# Patient Record
Sex: FEMALE | Race: ASIAN | NOT HISPANIC OR LATINO | URBAN - METROPOLITAN AREA
[De-identification: names, ages, dates, MRNs, and addresses within clinical notes are randomized per-mention and may not be internally consistent; named-entity substitution may affect disease eponyms.]

---

## 2021-04-22 ENCOUNTER — OUTPATIENT (OUTPATIENT)
Dept: INPATIENT UNIT | Facility: HOSPITAL | Age: 36
LOS: 1 days | Discharge: HOME | End: 2021-04-22
Payer: COMMERCIAL

## 2021-04-22 ENCOUNTER — INPATIENT (INPATIENT)
Facility: HOSPITAL | Age: 36
LOS: 3 days | Discharge: HOME | End: 2021-04-26
Attending: OBSTETRICS & GYNECOLOGY | Admitting: OBSTETRICS & GYNECOLOGY

## 2021-04-22 VITALS — DIASTOLIC BLOOD PRESSURE: 79 MMHG | SYSTOLIC BLOOD PRESSURE: 127 MMHG | HEART RATE: 86 BPM

## 2021-04-22 VITALS — TEMPERATURE: 98 F

## 2021-04-22 VITALS — HEART RATE: 75 BPM | DIASTOLIC BLOOD PRESSURE: 66 MMHG | SYSTOLIC BLOOD PRESSURE: 134 MMHG

## 2021-04-22 DIAGNOSIS — N85.8 OTHER SPECIFIED NONINFLAMMATORY DISORDERS OF UTERUS: Chronic | ICD-10-CM

## 2021-04-22 DIAGNOSIS — Z3A.00 WEEKS OF GESTATION OF PREGNANCY NOT SPECIFIED: ICD-10-CM

## 2021-04-22 DIAGNOSIS — O26.899 OTHER SPECIFIED PREGNANCY RELATED CONDITIONS, UNSPECIFIED TRIMESTER: ICD-10-CM

## 2021-04-22 PROCEDURE — 76818 FETAL BIOPHYS PROFILE W/NST: CPT | Mod: 26

## 2021-04-22 PROCEDURE — 99205 OFFICE O/P NEW HI 60 MIN: CPT | Mod: 25

## 2021-04-22 RX ORDER — OXYTOCIN 10 UNIT/ML
333.33 VIAL (ML) INJECTION
Qty: 20 | Refills: 0 | Status: DISCONTINUED | OUTPATIENT
Start: 2021-04-22 | End: 2021-04-24

## 2021-04-22 RX ORDER — AMPICILLIN TRIHYDRATE 250 MG
2 CAPSULE ORAL ONCE
Refills: 0 | Status: COMPLETED | OUTPATIENT
Start: 2021-04-22 | End: 2021-04-22

## 2021-04-22 RX ORDER — AMPICILLIN TRIHYDRATE 250 MG
1 CAPSULE ORAL EVERY 4 HOURS
Refills: 0 | Status: DISCONTINUED | OUTPATIENT
Start: 2021-04-22 | End: 2021-04-24

## 2021-04-22 RX ORDER — SODIUM CHLORIDE 9 MG/ML
1000 INJECTION, SOLUTION INTRAVENOUS
Refills: 0 | Status: DISCONTINUED | OUTPATIENT
Start: 2021-04-22 | End: 2021-04-24

## 2021-04-22 RX ORDER — SODIUM CHLORIDE 9 MG/ML
1000 INJECTION, SOLUTION INTRAVENOUS ONCE
Refills: 0 | Status: COMPLETED | OUTPATIENT
Start: 2021-04-22 | End: 2021-04-23

## 2021-04-22 RX ADMIN — SODIUM CHLORIDE 125 MILLILITER(S): 9 INJECTION, SOLUTION INTRAVENOUS at 23:58

## 2021-04-22 RX ADMIN — Medication 216 GRAM(S): at 23:59

## 2021-04-22 NOTE — OB PROVIDER TRIAGE NOTE - HISTORY OF PRESENT ILLNESS
35 y/o F  at 40.2 presented to triage with c/o 4 contractions in 1 hour and bloody show. Pt endorses +Fm. Denies any lof, vb at the moment. 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 Otherwise, pregnancy thus far has been uncomplicated.

## 2021-04-22 NOTE — OB RN TRIAGE NOTE - TERM DELIVERIES, OB PROFILE
I will SWITCH the dose or number of times a day I take the medications listed below when I get home from the hospital:  None
0

## 2021-04-22 NOTE — OB PROVIDER H&P - ASSESSMENT
37 yo  @ 40.2 wks c/o contractions increase in frequency and intensity since this afternoon. contractions started at 0230 seen in D&T and discharged home. denies vb or lof, reports +GFM. AP course complicated by + GBS, HSV1- cold sores, anemia, yeast infection last week. denies fever chills dysuria n/v ha new swelling vision changes cp sob cough. last seen by OB tuesday VE 0.5, EFW 8#0. scheduled for induction 21.    GBS: Positive  meds: PNV Valtrex, iron  all: denies  PMH: thalessemia trait, anemia  PSH: ob procedure in office for urinary cyst?   gyn hx: denies  ob hx: denies    d/w Dr Mcqueen admit for labor @ 40.2 wks  Ampicillin for +GBS  for epidural for pain  prenatals reviewed  covid swab sent  repeat VE

## 2021-04-22 NOTE — OB PROVIDER TRIAGE NOTE - NSOBPROVIDERNOTE_OBGYN_ALL_OB_FT
Clear Discussed with Dr. Segal- maternal and fetal status reassuring. Ok to discharge patient home     No evidence of active labor at the moment.

## 2021-04-22 NOTE — OB PROVIDER TRIAGE NOTE - NSHPPHYSICALEXAM_GEN_ALL_CORE
LS clear bilaterally  abd soft gravid NT  Cv RRR  FHT: + accels negative decels moderate variability  toco: q 4-5 min 5/10 pain scale  Vital Signs Last 24 Hrs  T(C): 36.8 (22 Apr 2021 22:18), Max: 36.8 (22 Apr 2021 22:16)  T(F): 98.2 (22 Apr 2021 22:18), Max: 98.24 (22 Apr 2021 22:16)  HR: 65 (22 Apr 2021 22:18) (65 - 86)  BP: 129/60 (22 Apr 2021 22:18) (127/79 - 134/66)  BP(mean): --  RR: 16 (22 Apr 2021 22:18) (16 - 16)  SpO2: --

## 2021-04-22 NOTE — OB RN TRIAGE NOTE - NS_DISPOSITION_OBGYN_ALL_OB
Continue to Observe Discharge H Plasty Text: Given the location of the defect, shape of the defect and the proximity to free margins a H-plasty was deemed most appropriate for repair.  Using a sterile surgical marker, the appropriate advancement arms of the H-plasty were drawn incorporating the defect and placing the expected incisions within the relaxed skin tension lines where possible. The area thus outlined was incised deep to adipose tissue with a #15 scalpel blade. The skin margins were undermined to an appropriate distance in all directions utilizing iris scissors.  The opposing advancement arms were then advanced into place in opposite direction and anchored with interrupted buried subcutaneous sutures.

## 2021-04-22 NOTE — OB PROVIDER H&P - NSPPHNORISK_OBGYN_ALL_OB
What Is The Reason For Today's Visit?: Excessive sun exposure
In my judgment no risk for PPH has been identified at this time.

## 2021-04-22 NOTE — OB PROVIDER H&P - PROBLEM SELECTOR PLAN 1
d/w Dr Mcqueen admit for labor @ 40.2 wks  Ampicillin for +GBS  for epidural for pain  prenatals reviewed  covid swab sent  repeat VE

## 2021-04-22 NOTE — OB PROVIDER TRIAGE NOTE - HISTORY OF PRESENT ILLNESS
35 yo  @ 40.2 wks c/o contractions increase in frequency and intensity since this afternoon. contractions started at 0230 seen in D&T and discharged home. denies vb or lof, reports +GFM. AP course complicated by + GBS, HSV1- cold sores, anemia, yeast infection last week. denies fever chills dysuria n/v ha new swelling vision changes cp sob cough. last seen by OB tuesday VE 0.5, EFW 8#0. scheduled for induction 21.    GBS: Positive  meds: PNV Valtrex, iron  all: denies  PMH: thalessemia trait, anemia  PSH: ob procedure in office for urinary cyst?   gyn hx: denies  ob hx: denies

## 2021-04-22 NOTE — OB PROVIDER H&P - HISTORY OF PRESENT ILLNESS
37 yo  @ 40.2 wks c/o contractions increase in frequency and intensity since this afternoon. contractions started at 0230 seen in D&T and discharged home. denies vb or lof, reports +GFM. AP course complicated by + GBS, HSV1- cold sores, anemia, yeast infection last week. denies fever chills dysuria n/v ha new swelling vision changes cp sob cough. last seen by OB tuesday VE 0.5, EFW 8#0. scheduled for induction 21.    GBS: Positive  meds: PNV Valtrex, iron  all: denies  PMH: thalessemia trait, anemia  PSH: ob procedure in office for urinary cyst?   gyn hx: denies  ob hx: denies

## 2021-04-23 ENCOUNTER — TRANSCRIPTION ENCOUNTER (OUTPATIENT)
Age: 36
End: 2021-04-23

## 2021-04-23 PROBLEM — B00.89 OTHER HERPESVIRAL INFECTION: Chronic | Status: ACTIVE | Noted: 2021-04-22

## 2021-04-23 PROBLEM — D64.9 ANEMIA, UNSPECIFIED: Chronic | Status: ACTIVE | Noted: 2021-04-22

## 2021-04-23 LAB
BASOPHILS # BLD AUTO: 0.02 K/UL — SIGNIFICANT CHANGE UP (ref 0–0.2)
BASOPHILS NFR BLD AUTO: 0.2 % — SIGNIFICANT CHANGE UP (ref 0–2)
BLD GP AB SCN SERPL QL: NEGATIVE — SIGNIFICANT CHANGE UP
COVID-19 SPIKE DOMAIN AB INTERP: POSITIVE
COVID-19 SPIKE DOMAIN ANTIBODY RESULT: >250 U/ML — HIGH
EOSINOPHIL # BLD AUTO: 0.03 K/UL — SIGNIFICANT CHANGE UP (ref 0–0.5)
EOSINOPHIL NFR BLD AUTO: 0.3 % — SIGNIFICANT CHANGE UP (ref 0–6)
HCT VFR BLD CALC: 34.4 % — LOW (ref 34.5–45)
HGB BLD-MCNC: 10.9 G/DL — LOW (ref 11.5–15.5)
IANC: 8.38 K/UL — SIGNIFICANT CHANGE UP (ref 1.5–8.5)
IMM GRANULOCYTES NFR BLD AUTO: 0.6 % — SIGNIFICANT CHANGE UP (ref 0–1.5)
LYMPHOCYTES # BLD AUTO: 1.14 K/UL — SIGNIFICANT CHANGE UP (ref 1–3.3)
LYMPHOCYTES # BLD AUTO: 11 % — LOW (ref 13–44)
MCHC RBC-ENTMCNC: 24 PG — LOW (ref 27–34)
MCHC RBC-ENTMCNC: 31.7 GM/DL — LOW (ref 32–36)
MCV RBC AUTO: 75.6 FL — LOW (ref 80–100)
MONOCYTES # BLD AUTO: 0.78 K/UL — SIGNIFICANT CHANGE UP (ref 0–0.9)
MONOCYTES NFR BLD AUTO: 7.5 % — SIGNIFICANT CHANGE UP (ref 2–14)
NEUTROPHILS # BLD AUTO: 8.38 K/UL — HIGH (ref 1.8–7.4)
NEUTROPHILS NFR BLD AUTO: 80.4 % — HIGH (ref 43–77)
NRBC # BLD: 0 /100 WBCS — SIGNIFICANT CHANGE UP
NRBC # FLD: 0 K/UL — SIGNIFICANT CHANGE UP
PLATELET # BLD AUTO: 236 K/UL — SIGNIFICANT CHANGE UP (ref 150–400)
RBC # BLD: 4.55 M/UL — SIGNIFICANT CHANGE UP (ref 3.8–5.2)
RBC # FLD: 13.3 % — SIGNIFICANT CHANGE UP (ref 10.3–14.5)
RH IG SCN BLD-IMP: POSITIVE — SIGNIFICANT CHANGE UP
RH IG SCN BLD-IMP: POSITIVE — SIGNIFICANT CHANGE UP
SARS-COV-2 IGG+IGM SERPL QL IA: >250 U/ML — HIGH
SARS-COV-2 IGG+IGM SERPL QL IA: POSITIVE
SARS-COV-2 RNA SPEC QL NAA+PROBE: SIGNIFICANT CHANGE UP
T PALLIDUM AB TITR SER: NEGATIVE — SIGNIFICANT CHANGE UP
WBC # BLD: 10.41 K/UL — SIGNIFICANT CHANGE UP (ref 3.8–10.5)
WBC # FLD AUTO: 10.41 K/UL — SIGNIFICANT CHANGE UP (ref 3.8–10.5)

## 2021-04-23 RX ORDER — IBUPROFEN 200 MG
1 TABLET ORAL
Qty: 0 | Refills: 0 | DISCHARGE
Start: 2021-04-23

## 2021-04-23 RX ORDER — PRAMOXINE HYDROCHLORIDE 150 MG/15G
1 AEROSOL, FOAM RECTAL EVERY 4 HOURS
Refills: 0 | Status: DISCONTINUED | OUTPATIENT
Start: 2021-04-23 | End: 2021-04-26

## 2021-04-23 RX ORDER — DIPHENHYDRAMINE HCL 50 MG
25 CAPSULE ORAL EVERY 6 HOURS
Refills: 0 | Status: DISCONTINUED | OUTPATIENT
Start: 2021-04-23 | End: 2021-04-26

## 2021-04-23 RX ORDER — ACETAMINOPHEN 500 MG
975 TABLET ORAL
Refills: 0 | Status: DISCONTINUED | OUTPATIENT
Start: 2021-04-23 | End: 2021-04-26

## 2021-04-23 RX ORDER — MAGNESIUM HYDROXIDE 400 MG/1
30 TABLET, CHEWABLE ORAL
Refills: 0 | Status: DISCONTINUED | OUTPATIENT
Start: 2021-04-23 | End: 2021-04-26

## 2021-04-23 RX ORDER — OXYCODONE HYDROCHLORIDE 5 MG/1
5 TABLET ORAL ONCE
Refills: 0 | Status: DISCONTINUED | OUTPATIENT
Start: 2021-04-23 | End: 2021-04-26

## 2021-04-23 RX ORDER — AER TRAVELER 0.5 G/1
1 SOLUTION RECTAL; TOPICAL EVERY 4 HOURS
Refills: 0 | Status: DISCONTINUED | OUTPATIENT
Start: 2021-04-23 | End: 2021-04-26

## 2021-04-23 RX ORDER — OXYTOCIN 10 UNIT/ML
2 VIAL (ML) INJECTION
Qty: 30 | Refills: 0 | Status: DISCONTINUED | OUTPATIENT
Start: 2021-04-23 | End: 2021-04-23

## 2021-04-23 RX ORDER — SODIUM CHLORIDE 9 MG/ML
3 INJECTION INTRAMUSCULAR; INTRAVENOUS; SUBCUTANEOUS EVERY 8 HOURS
Refills: 0 | Status: DISCONTINUED | OUTPATIENT
Start: 2021-04-23 | End: 2021-04-26

## 2021-04-23 RX ORDER — OXYTOCIN 10 UNIT/ML
333.33 VIAL (ML) INJECTION
Qty: 20 | Refills: 0 | Status: DISCONTINUED | OUTPATIENT
Start: 2021-04-23 | End: 2021-04-24

## 2021-04-23 RX ORDER — DIBUCAINE 1 %
1 OINTMENT (GRAM) RECTAL EVERY 6 HOURS
Refills: 0 | Status: DISCONTINUED | OUTPATIENT
Start: 2021-04-23 | End: 2021-04-26

## 2021-04-23 RX ORDER — OXYTOCIN 10 UNIT/ML
VIAL (ML) INJECTION
Qty: 30 | Refills: 0 | Status: DISCONTINUED | OUTPATIENT
Start: 2021-04-23 | End: 2021-04-24

## 2021-04-23 RX ORDER — FERROUS SULFATE 325(65) MG
0 TABLET ORAL
Qty: 0 | Refills: 0 | DISCHARGE

## 2021-04-23 RX ORDER — LANOLIN
1 OINTMENT (GRAM) TOPICAL EVERY 6 HOURS
Refills: 0 | Status: DISCONTINUED | OUTPATIENT
Start: 2021-04-23 | End: 2021-04-26

## 2021-04-23 RX ORDER — OXYCODONE HYDROCHLORIDE 5 MG/1
5 TABLET ORAL
Refills: 0 | Status: DISCONTINUED | OUTPATIENT
Start: 2021-04-23 | End: 2021-04-26

## 2021-04-23 RX ORDER — IBUPROFEN 200 MG
600 TABLET ORAL EVERY 6 HOURS
Refills: 0 | Status: COMPLETED | OUTPATIENT
Start: 2021-04-23 | End: 2022-03-22

## 2021-04-23 RX ORDER — KETOROLAC TROMETHAMINE 30 MG/ML
30 SYRINGE (ML) INJECTION ONCE
Refills: 0 | Status: DISCONTINUED | OUTPATIENT
Start: 2021-04-23 | End: 2021-04-23

## 2021-04-23 RX ORDER — SIMETHICONE 80 MG/1
80 TABLET, CHEWABLE ORAL EVERY 4 HOURS
Refills: 0 | Status: DISCONTINUED | OUTPATIENT
Start: 2021-04-23 | End: 2021-04-26

## 2021-04-23 RX ORDER — VALACYCLOVIR 500 MG/1
0 TABLET, FILM COATED ORAL
Qty: 0 | Refills: 0 | DISCHARGE

## 2021-04-23 RX ORDER — BENZOCAINE 10 %
1 GEL (GRAM) MUCOUS MEMBRANE EVERY 6 HOURS
Refills: 0 | Status: DISCONTINUED | OUTPATIENT
Start: 2021-04-23 | End: 2021-04-26

## 2021-04-23 RX ORDER — ACETAMINOPHEN 500 MG
3 TABLET ORAL
Qty: 0 | Refills: 0 | DISCHARGE
Start: 2021-04-23

## 2021-04-23 RX ORDER — TETANUS TOXOID, REDUCED DIPHTHERIA TOXOID AND ACELLULAR PERTUSSIS VACCINE, ADSORBED 5; 2.5; 8; 8; 2.5 [IU]/.5ML; [IU]/.5ML; UG/.5ML; UG/.5ML; UG/.5ML
0.5 SUSPENSION INTRAMUSCULAR ONCE
Refills: 0 | Status: DISCONTINUED | OUTPATIENT
Start: 2021-04-23 | End: 2021-04-26

## 2021-04-23 RX ORDER — HYDROCORTISONE 1 %
1 OINTMENT (GRAM) TOPICAL EVERY 6 HOURS
Refills: 0 | Status: DISCONTINUED | OUTPATIENT
Start: 2021-04-23 | End: 2021-04-26

## 2021-04-23 RX ADMIN — Medication 108 GRAM(S): at 12:00

## 2021-04-23 RX ADMIN — SODIUM CHLORIDE 3 MILLILITER(S): 9 INJECTION INTRAMUSCULAR; INTRAVENOUS; SUBCUTANEOUS at 22:00

## 2021-04-23 RX ADMIN — Medication 108 GRAM(S): at 08:00

## 2021-04-23 RX ADMIN — SODIUM CHLORIDE 2000 MILLILITER(S): 9 INJECTION, SOLUTION INTRAVENOUS at 01:30

## 2021-04-23 RX ADMIN — SODIUM CHLORIDE 125 MILLILITER(S): 9 INJECTION, SOLUTION INTRAVENOUS at 07:56

## 2021-04-23 RX ADMIN — Medication 2 MILLIUNIT(S)/MIN: at 19:30

## 2021-04-23 RX ADMIN — Medication 108 GRAM(S): at 04:01

## 2021-04-23 RX ADMIN — Medication 30 MILLIGRAM(S): at 23:39

## 2021-04-23 RX ADMIN — Medication 108 GRAM(S): at 20:00

## 2021-04-23 RX ADMIN — SODIUM CHLORIDE 125 MILLILITER(S): 9 INJECTION, SOLUTION INTRAVENOUS at 19:30

## 2021-04-23 RX ADMIN — Medication 108 GRAM(S): at 16:00

## 2021-04-23 RX ADMIN — Medication 2 MILLIUNIT(S)/MIN: at 08:55

## 2021-04-23 RX ADMIN — Medication 1000 MILLIUNIT(S)/MIN: at 21:50

## 2021-04-23 NOTE — OB RN PATIENT PROFILE - NS PRO DEPRESSION SCREENING Y/N6
Vital Signs: I have reviewed the initial vital signs.  Constitutional: well-nourished, appears stated age, no acute distress  Cardiovascular: regular rate, regular rhythm, well-perfused extremities  Respiratory: unlabored respiratory effort, clear to auscultation bilaterally  Gastrointestinal: soft, non-tender abdomen  Musculoskeletal: supple neck, no lower extremity edema. (+) L hand 5th digit partially avulsed nail with slight yellow drainage. No giovanni pus. No erythema or ecchymosis. Neurovascularly intact.   Integumentary: warm, dry, no rash  Neurologic: awake, alert, extremities’ motor and sensory functions grossly intact  Psychiatric: appropriate mood, appropriate affect no

## 2021-04-23 NOTE — OB RN DELIVERY SUMMARY - NS_SEPSISRSKCALC_OBGYN_ALL_OB_FT
Side effects of pain management treatment/Education provided on the pain management plan of care/Activities of daily living, including home environment that might     exacerbate pain or reduce effectiveness of the pain management plan of care as well as strategies to address these issues EOS calculated successfully. EOS Risk Factor: 0.5/1000 live births ([facility] national incidence); GA=40w3d; Temp=99.14; ROM=9.533; GBS='Positive'; Antibiotics='Broad spectrum antibiotics 2-3.9 hrs prior to birth'

## 2021-04-23 NOTE — DISCHARGE NOTE OB - MATERIALS PROVIDED
[facility] [place] Screening Program/Breastfeeding Log/Breastfeeding Mother’s Support Group Information/Guide to Postpartum Care/[facility] Hearing Screen Program/Back To Sleep Handout/Shaken Baby Prevention Handout/Breastfeeding Guide and Packet/Birth Certificate Instructions/Discharge Medication Information for Patients and Families Pocket Guide

## 2021-04-23 NOTE — OB NEONATOLOGY/PEDIATRICIAN DELIVERY SUMMARY - NSPEDSNEONOTESA_OBGYN_ALL_OB_FT
40.3 wk female born to a 37 y/o  mother via . Peds called for Cat II. Maternal history of anemia on iron, HSV on valtrex, and dermatitis. Pregnancy uncomplicated. Maternal blood type B+. Prenatal labs N/NR/I. GBS positive on 3/2. Received amp x6. AROM at 11:55 on , clear fluids. Baby born vigorous and crying spontaneously. L should dystocia. APGARS 9/9. EOS 0.15. Mom plans to breast and bottle feed and wants hep B.

## 2021-04-23 NOTE — OB RN DELIVERY SUMMARY - NS_INTRAPARTUMABXTYPE_OBGYN_ALL_OB
Thank you for choosing Marquita Simon MD at [place] [address]  To Do: Camron Sanderson  1. Please see info below  [facility] is located in [address]. Monday, Tuesday & Friday – 8 a.m. to 4 p.m. Wednesday, Thursday – 7 a.m. to 3 p.m.   Jessenia Huffman • Please call our office about any questions regarding your treatment/medicines/tests as a result of today's visit.  For your safety, read the entire package insert of all medicines prescribed to you and be aware of all of the risks of treatment even beyon Good sleeping habits are a key part of treatment. If needed, some medicines may help you sleep better at first. Making healthy lifestyle changes and learning to relax can improve your sleep. Treating insomnia takes commitment.  But trust that your efforts w Stress, anxiety, and body tension may keep you awake at night. To unwind before bedtime, try a warm bath, meditation, or yoga. Also try the following:  · Deep breathing. Sit or lie back in a chair. Take a slow, deep breath. Hold it for 5 counts.  Then breat Broad spectrum antibiotics 2-3.9 hrs prior to birth

## 2021-04-23 NOTE — CHART NOTE - NSCHARTNOTEFT_GEN_A_CORE
Patient seen and examined at bedside. Comfortable with epidural. VE; 3.5/80/-3, no HSV lesions seen on speculum exam. Tracing reviewed - Cat 1, contractions q5-6 minutes. Will start pitocin for augmentation.

## 2021-04-23 NOTE — DISCHARGE NOTE OB - PATIENT PORTAL LINK FT
You can access the FollowMyHealth Patient Portal offered by [facility] by registering at the following website: [email or web address]. By joining [employer]’s FollowMyHealth portal, you will also be able to view your health information using other applications (apps) compatible with our system.

## 2021-04-23 NOTE — DISCHARGE NOTE OB - CARE PROVIDER_API CALL
Jairo Allred)  Obstetrics and Gynecology Obstetrics and Gynocology  [address] [place] [address]  Phone: [phone]  Fax: [phone]  Follow Up Time:

## 2021-04-23 NOTE — OB PROVIDER DELIVERY SUMMARY - NSPROVIDERDELIVERYNOTE_OBGYN_ALL_OB_FT
, viable female , weight 9lb1oz, APGARS 9/9   shoulder dystocia addressed by jhony and suprapubic with no relief, thus corkscrew and and delivery of posterior arm performed with rapid delivery of the infant following,   RML episiotomy repaired with vicryl suture, ebl 450cc   cord tissue and placental collection performed

## 2021-04-23 NOTE — DISCHARGE NOTE OB - MEDICATION SUMMARY - MEDICATIONS TO TAKE
I will START or STAY ON the medications listed below when I get home from the hospital:    acetaminophen 325 mg oral tablet  -- 3 tab(s) by mouth   -- Indication: For Vaginal delivery    ibuprofen 600 mg oral tablet  -- 1 tab(s) by mouth every 6 hours  -- Indication: For Vaginal delivery

## 2021-04-24 LAB
HCT VFR BLD CALC: 25.1 % — LOW (ref 34.5–45)
HGB BLD-MCNC: 7.9 G/DL — LOW (ref 11.5–15.5)
MCHC RBC-ENTMCNC: 24.2 PG — LOW (ref 27–34)
MCHC RBC-ENTMCNC: 31.5 GM/DL — LOW (ref 32–36)
MCV RBC AUTO: 77 FL — LOW (ref 80–100)
NRBC # BLD: 0 /100 WBCS — SIGNIFICANT CHANGE UP
NRBC # FLD: 0 K/UL — SIGNIFICANT CHANGE UP
PLATELET # BLD AUTO: 168 K/UL — SIGNIFICANT CHANGE UP (ref 150–400)
RBC # BLD: 3.26 M/UL — LOW (ref 3.8–5.2)
RBC # FLD: 13.3 % — SIGNIFICANT CHANGE UP (ref 10.3–14.5)
WBC # BLD: 17.34 K/UL — HIGH (ref 3.8–10.5)
WBC # FLD AUTO: 17.34 K/UL — HIGH (ref 3.8–10.5)

## 2021-04-24 RX ORDER — ASCORBIC ACID 60 MG
500 TABLET,CHEWABLE ORAL DAILY
Refills: 0 | Status: DISCONTINUED | OUTPATIENT
Start: 2021-04-24 | End: 2021-04-26

## 2021-04-24 RX ORDER — SODIUM CHLORIDE 9 MG/ML
1000 INJECTION, SOLUTION INTRAVENOUS ONCE
Refills: 0 | Status: COMPLETED | OUTPATIENT
Start: 2021-04-24 | End: 2021-04-24

## 2021-04-24 RX ORDER — FERROUS SULFATE 325(65) MG
325 TABLET ORAL THREE TIMES A DAY
Refills: 0 | Status: DISCONTINUED | OUTPATIENT
Start: 2021-04-24 | End: 2021-04-26

## 2021-04-24 RX ORDER — POLYETHYLENE GLYCOL 3350 17 G/17G
17 POWDER, FOR SOLUTION ORAL ONCE
Refills: 0 | Status: DISCONTINUED | OUTPATIENT
Start: 2021-04-24 | End: 2021-04-26

## 2021-04-24 RX ORDER — IBUPROFEN 200 MG
600 TABLET ORAL EVERY 6 HOURS
Refills: 0 | Status: DISCONTINUED | OUTPATIENT
Start: 2021-04-24 | End: 2021-04-26

## 2021-04-24 RX ADMIN — Medication 600 MILLIGRAM(S): at 16:32

## 2021-04-24 RX ADMIN — Medication 975 MILLIGRAM(S): at 20:15

## 2021-04-24 RX ADMIN — OXYCODONE HYDROCHLORIDE 5 MILLIGRAM(S): 5 TABLET ORAL at 23:01

## 2021-04-24 RX ADMIN — Medication 600 MILLIGRAM(S): at 23:35

## 2021-04-24 RX ADMIN — Medication 600 MILLIGRAM(S): at 09:30

## 2021-04-24 RX ADMIN — SODIUM CHLORIDE 1000 MILLILITER(S): 9 INJECTION, SOLUTION INTRAVENOUS at 08:14

## 2021-04-24 RX ADMIN — SIMETHICONE 80 MILLIGRAM(S): 80 TABLET, CHEWABLE ORAL at 05:00

## 2021-04-24 RX ADMIN — OXYCODONE HYDROCHLORIDE 5 MILLIGRAM(S): 5 TABLET ORAL at 23:35

## 2021-04-24 RX ADMIN — OXYCODONE HYDROCHLORIDE 5 MILLIGRAM(S): 5 TABLET ORAL at 20:15

## 2021-04-24 RX ADMIN — Medication 600 MILLIGRAM(S): at 17:32

## 2021-04-24 RX ADMIN — Medication 975 MILLIGRAM(S): at 18:46

## 2021-04-24 RX ADMIN — Medication 975 MILLIGRAM(S): at 05:30

## 2021-04-24 RX ADMIN — SODIUM CHLORIDE 3 MILLILITER(S): 9 INJECTION INTRAMUSCULAR; INTRAVENOUS; SUBCUTANEOUS at 14:16

## 2021-04-24 RX ADMIN — OXYCODONE HYDROCHLORIDE 5 MILLIGRAM(S): 5 TABLET ORAL at 18:50

## 2021-04-24 RX ADMIN — Medication 975 MILLIGRAM(S): at 12:30

## 2021-04-24 RX ADMIN — Medication 325 MILLIGRAM(S): at 23:03

## 2021-04-24 RX ADMIN — Medication 600 MILLIGRAM(S): at 23:01

## 2021-04-24 RX ADMIN — Medication 975 MILLIGRAM(S): at 11:36

## 2021-04-24 RX ADMIN — Medication 600 MILLIGRAM(S): at 10:05

## 2021-04-24 RX ADMIN — SODIUM CHLORIDE 1000 MILLILITER(S): 9 INJECTION, SOLUTION INTRAVENOUS at 01:00

## 2021-04-24 RX ADMIN — SODIUM CHLORIDE 3 MILLILITER(S): 9 INJECTION INTRAMUSCULAR; INTRAVENOUS; SUBCUTANEOUS at 08:14

## 2021-04-24 RX ADMIN — Medication 975 MILLIGRAM(S): at 17:09

## 2021-04-24 RX ADMIN — SODIUM CHLORIDE 3 MILLILITER(S): 9 INJECTION INTRAMUSCULAR; INTRAVENOUS; SUBCUTANEOUS at 23:01

## 2021-04-24 RX ADMIN — Medication 30 MILLIGRAM(S): at 01:00

## 2021-04-24 RX ADMIN — Medication 325 MILLIGRAM(S): at 16:32

## 2021-04-24 RX ADMIN — Medication 500 MILLIGRAM(S): at 16:32

## 2021-04-24 NOTE — OB POSTPARTUM EVENT NOTE - NS_EVENTPTSUMMARY1_OBGYN_ALL_OB_FT
vital signs at 0807 are as follows:  /70  HR 76  temp 36.9  RR 17    patient denies any pain at this time  fundus firm with light bleeding and distended to the right

## 2021-04-24 NOTE — OB POSTPARTUM EVENT NOTE - NS_EVENTSUMMARY1_OBGYN_ALL_OB_FT
received care of patient for change of shift. patient status post  at  on  to viable female complicated by shoulder dystocia.  40.3 weeks  with RML repaired. multiple attempts of orthostatics completed overnight but failed because patient felt dizzy upon sitting and standing and patient was noted to be hypotensive. 1 liter bolus was given at 0100 and a straight catheterization was performed at 0600 in which 800 cc of urine was noted. straight cath performed because uterus was distended and patient was unable to void. repeat CBC sent at 0630 and results are as follows:  H&H : 7.9/25.1  platelets: 163  WBC: 17.34

## 2021-04-24 NOTE — CHART NOTE - NSCHARTNOTEFT_GEN_A_CORE
Patient seen and examined at bedside earlier. Notified by nurse that patient failed orthostatics x 3.   Patient reports feeling a little tired and experienced dizziness when she stood up. Patient is tolerating a regular diet and fluids. Uterus is firm and bleeding is minimal at this time. Discussed findings with patient. Hgb dropped from 10.9-> 7.9. Discussed giving patient IV fluids and following up on how she is doing. Discussed possible blood transfusion in the event she is symptomatic. Patient not interested in blood transfusion at this time.      Update: Patient received 1L bolus and past her orthostatics. She is no longer is feeling dizzy but does feel fatigue. Don't recommend blood transfusion at this time. Patient is cleared to be transferred to the Postpartum floor. In the event patient becomes symptomatic or vitals change will do STAT CBC and recommend blood transfusion.

## 2021-04-24 NOTE — LACTATION INITIAL EVALUATION - INTERVENTION OUTCOME
nbn demonstrated  deep latch and  performed  with sucking and swallowing  noted .  discussed  with  mother  to ask  for  assistance  as  mother  has  iv  and  difficulty  moving  in  bed.  rn  aware .   encouraged  toask  for  assistance   as mother  has  iv  and  states  difficulty  moving  in  bed.//verbalizes understanding

## 2021-04-24 NOTE — OB POSTPARTUM EVENT NOTE - NS_EVENTFINDINGS1_OBGYN_ALL_OB_FT
MD Cummings at bedside at 0810 evaluating patient. patient to receive another 1 liter bolus of lactated ringers per MD Cummings. bolus started at 0814. patient to have a solorio catheter placed for 24 hours due to distension of uterus. repeat orthostatics to be completed once 1 liter bolus is completed. solorio catheter to be placed at this time. if patient fails repeat orthostatics, discussion about possible blood transfusion to be addressed with patient and MD Cummings. patient verbalizes understanding regarding plan of care and all questions are answered

## 2021-04-25 LAB
HCT VFR BLD CALC: 25.7 % — LOW (ref 34.5–45)
HGB BLD-MCNC: 8.1 G/DL — LOW (ref 11.5–15.5)
MCHC RBC-ENTMCNC: 24.2 PG — LOW (ref 27–34)
MCHC RBC-ENTMCNC: 31.5 GM/DL — LOW (ref 32–36)
MCV RBC AUTO: 76.7 FL — LOW (ref 80–100)
NRBC # BLD: 0 /100 WBCS — SIGNIFICANT CHANGE UP
NRBC # FLD: 0 K/UL — SIGNIFICANT CHANGE UP
PLATELET # BLD AUTO: 186 K/UL — SIGNIFICANT CHANGE UP (ref 150–400)
RBC # BLD: 3.35 M/UL — LOW (ref 3.8–5.2)
RBC # FLD: 13.5 % — SIGNIFICANT CHANGE UP (ref 10.3–14.5)
WBC # BLD: 12.16 K/UL — HIGH (ref 3.8–10.5)
WBC # FLD AUTO: 12.16 K/UL — HIGH (ref 3.8–10.5)

## 2021-04-25 RX ADMIN — Medication 600 MILLIGRAM(S): at 12:40

## 2021-04-25 RX ADMIN — MAGNESIUM HYDROXIDE 30 MILLILITER(S): 400 TABLET, CHEWABLE ORAL at 09:09

## 2021-04-25 RX ADMIN — Medication 975 MILLIGRAM(S): at 10:09

## 2021-04-25 RX ADMIN — Medication 1 APPLICATION(S): at 05:07

## 2021-04-25 RX ADMIN — Medication 600 MILLIGRAM(S): at 23:52

## 2021-04-25 RX ADMIN — Medication 600 MILLIGRAM(S): at 05:07

## 2021-04-25 RX ADMIN — Medication 600 MILLIGRAM(S): at 23:22

## 2021-04-25 RX ADMIN — Medication 975 MILLIGRAM(S): at 14:47

## 2021-04-25 RX ADMIN — Medication 500 MILLIGRAM(S): at 11:42

## 2021-04-25 RX ADMIN — Medication 600 MILLIGRAM(S): at 18:08

## 2021-04-25 RX ADMIN — PRAMOXINE HYDROCHLORIDE 1 APPLICATION(S): 150 AEROSOL, FOAM RECTAL at 05:07

## 2021-04-25 RX ADMIN — Medication 1 SPRAY(S): at 05:07

## 2021-04-25 RX ADMIN — Medication 975 MILLIGRAM(S): at 20:41

## 2021-04-25 RX ADMIN — Medication 325 MILLIGRAM(S): at 05:57

## 2021-04-25 RX ADMIN — Medication 325 MILLIGRAM(S): at 14:46

## 2021-04-25 RX ADMIN — Medication 1 TABLET(S): at 11:42

## 2021-04-25 RX ADMIN — SODIUM CHLORIDE 3 MILLILITER(S): 9 INJECTION INTRAMUSCULAR; INTRAVENOUS; SUBCUTANEOUS at 23:24

## 2021-04-25 RX ADMIN — Medication 600 MILLIGRAM(S): at 19:08

## 2021-04-25 RX ADMIN — SODIUM CHLORIDE 3 MILLILITER(S): 9 INJECTION INTRAMUSCULAR; INTRAVENOUS; SUBCUTANEOUS at 16:38

## 2021-04-25 RX ADMIN — AER TRAVELER 1 APPLICATION(S): 0.5 SOLUTION RECTAL; TOPICAL at 05:07

## 2021-04-25 RX ADMIN — OXYCODONE HYDROCHLORIDE 5 MILLIGRAM(S): 5 TABLET ORAL at 20:48

## 2021-04-25 RX ADMIN — Medication 975 MILLIGRAM(S): at 21:18

## 2021-04-25 RX ADMIN — OXYCODONE HYDROCHLORIDE 5 MILLIGRAM(S): 5 TABLET ORAL at 21:18

## 2021-04-25 RX ADMIN — Medication 975 MILLIGRAM(S): at 15:40

## 2021-04-25 RX ADMIN — Medication 975 MILLIGRAM(S): at 09:09

## 2021-04-25 RX ADMIN — Medication 600 MILLIGRAM(S): at 11:42

## 2021-04-25 RX ADMIN — SODIUM CHLORIDE 3 MILLILITER(S): 9 INJECTION INTRAMUSCULAR; INTRAVENOUS; SUBCUTANEOUS at 05:57

## 2021-04-25 RX ADMIN — Medication 600 MILLIGRAM(S): at 05:57

## 2021-04-25 RX ADMIN — Medication 325 MILLIGRAM(S): at 23:22

## 2021-04-25 NOTE — PROGRESS NOTE ADULT - ATTENDING COMMENTS
D/c solorio   ambulate as tolerated   tylenol / Motrin first , them oxy for breakthrough pain   consider late discharge to home today

## 2021-04-25 NOTE — CHART NOTE - NSCHARTNOTEFT_GEN_A_CORE
solorio was d/c earlier this morning , with spontaneous void x, at least 800cc  Patient still however , complains of significant perineal discomfort with difficulty ambulating   Discharge cancelled for today for pain management  tylenol/ motrin around the clock   possible discharge tomorrow

## 2021-04-25 NOTE — PROGRESS NOTE ADULT - ASSESSMENT
PE:  Lung sound clear bilaterally. Normal heart rhythm.  Breasts: soft, nontender  Nipples: intact  Abdomen: Soft, nontender, nondistended. Fundus firm. Positive bowel sounds  Vagina: Lochia light rubra  Perineum:  Moderate edema with no erythema noted to perineum and bilateral labia majora. Hemorrhoid present. Rebollar in place  Laceration/episiotomy site: right mediolateral episiotomy   Lower extremities: Slight edema noted bilaterally and equal of lower extremities. Nontender calves.  No erythema noted. Positive pedal pulses. No palpable veins noted.  Other relevant physical exam findings: patient ambulated to chair to bed slowly due to reports perineal discomfort and pain.           A/P: 36y . Day #2  postpartum  shoulder dystocia c/b dizziness postpartum s/p bolus, straight catheterizedx2 then foleyx 24hours. EBL 450cc.        Problem#1: Vaginal delivery  Continue routine postpartum care.   Increase ambulation, analgesia PRN and pain medication protocol standing ibuprofen and acetaminophen and oxycodone prn.  Continue regular diet.   Discussed breast/nipple care and breastfeeding.  Discussed pericare and sitz bath.  Discussed discharge planning.        Problem#2: [id number] hours due to straight catheterized x2 and moderated perineal and labia majora bilaterally. CBC stable  Rebollar to be discontinued at 0900.   Encouraged pain medication protocol and ice to perineum.   Discussed use of topical creams and hemorrhoidal care.   Will continue to monitor.

## 2021-04-26 VITALS
OXYGEN SATURATION: 98 % | RESPIRATION RATE: 16 BRPM | DIASTOLIC BLOOD PRESSURE: 69 MMHG | HEART RATE: 68 BPM | SYSTOLIC BLOOD PRESSURE: 132 MMHG

## 2021-04-26 RX ADMIN — Medication 600 MILLIGRAM(S): at 06:43

## 2021-04-26 RX ADMIN — Medication 975 MILLIGRAM(S): at 03:59

## 2021-04-26 RX ADMIN — MAGNESIUM HYDROXIDE 30 MILLILITER(S): 400 TABLET, CHEWABLE ORAL at 11:52

## 2021-04-26 RX ADMIN — Medication 600 MILLIGRAM(S): at 06:13

## 2021-04-26 RX ADMIN — Medication 600 MILLIGRAM(S): at 12:50

## 2021-04-26 RX ADMIN — Medication 600 MILLIGRAM(S): at 11:52

## 2021-04-26 RX ADMIN — Medication 325 MILLIGRAM(S): at 11:51

## 2021-04-26 RX ADMIN — Medication 500 MILLIGRAM(S): at 11:52

## 2021-04-26 RX ADMIN — Medication 975 MILLIGRAM(S): at 04:29

## 2021-04-26 RX ADMIN — SIMETHICONE 80 MILLIGRAM(S): 80 TABLET, CHEWABLE ORAL at 11:51

## 2021-04-26 RX ADMIN — Medication 1 TABLET(S): at 11:52

## 2021-04-26 NOTE — PROGRESS NOTE ADULT - SUBJECTIVE AND OBJECTIVE BOX
Patient assessed at 0730.  Subjective  36y . PPD#2   . Past medical/surgical/OB/GYN history significant for HSV, anemia, uterine cystectomy   Patient reports perineal discomfort, with slight improvement with pain medication protocol and ice packs.  Patient states she had dizziness after delivery but denies any dizziness, blur vision, headache, difficulties breathing, shortness of breath and/or heart palpitations today.  Tolerating a regular diet. Patient reports difficulties ambulating due to perineal discomfort and edema. Patient reports breastfeeding with on difficulties.       Vitals  T(C): 36.8 (21 @ 06:19), Max: 36.9 (21 @ 17:22)  HR: 67 (21 @ 06:19) (59 - 105)  BP: 123/72 (21 @ 06:19) (101/59 - 129/81)  RR: 17 (21 @ 06:19) (17 - 18)  SpO2: 99% (21 @ 06:19) (92% - 100%)  Wt(kg): --        LABS:                          8.1    12.16 )-----------( 186      ( 2021 06:19 )             25.7                         7.9    17.34 )-----------( 168      ( 2021 07:17 )             25.1                         10.9   10.41 )-----------( 236      ( 2021 00:12 )             34.4       Blood Type: B Positive      Treponema Pallidum Antibody Interpretation: Negative ( @ 09:46)      COVID-19 negative             MEDICATIONS  (STANDING):  acetaminophen   Tablet .. 975 milliGRAM(s) Oral <User Schedule>  ascorbic acid 500 milliGRAM(s) Oral daily  diphtheria/tetanus/pertussis (acellular) Vaccine (ADAcel) 0.5 milliLiter(s) IntraMuscular once  ferrous    sulfate 325 milliGRAM(s) Oral three times a day  ibuprofen  Tablet. 600 milliGRAM(s) Oral every 6 hours  polyethylene glycol 3350 17 Gram(s) Oral once  prenatal multivitamin 1 Tablet(s) Oral daily  sodium chloride 0.9% lock flush 3 milliLiter(s) IV Push every 8 hours    MEDICATIONS  (PRN):  benzocaine 20%/menthol 0.5% Spray 1 Spray(s) Topical every 6 hours PRN for Perineal discomfort  dibucaine 1% Ointment 1 Application(s) Topical every 6 hours PRN Perineal discomfort  diphenhydrAMINE 25 milliGRAM(s) Oral every 6 hours PRN Pruritus  hydrocortisone 1% Cream 1 Application(s) Topical every 6 hours PRN Moderate Pain (4-6)  lanolin Ointment 1 Application(s) Topical every 6 hours PRN nipple soreness  magnesium hydroxide Suspension 30 milliLiter(s) Oral two times a day PRN Constipation  oxyCODONE    IR 5 milliGRAM(s) Oral every 3 hours PRN Moderate to Severe Pain (4-10)  oxyCODONE    IR 5 milliGRAM(s) Oral once PRN Moderate to Severe Pain (4-10)  pramoxine 1%/zinc 5% Cream 1 Application(s) Topical every 4 hours PRN Moderate Pain (4-6)  simethicone 80 milliGRAM(s) Chew every 4 hours PRN Gas  witch hazel Pads 1 Application(s) Topical every 4 hours PRN Perineal discomfort             
Subjective  36y . PPD#3   . Past medical/surgical/OB/GYN history significant for HSV, anemia, uterine cystectomy   Patient reports perineal discomfort, with slight improvement with pain medication protocol and ice packs.  Patient states she had dizziness after delivery but denies any dizziness, blur vision, headache, difficulties breathing, shortness of breath and/or heart palpitations today.  Tolerating a regular diet. Patient reports difficulties ambulating due to perineal discomfort and edema. Patient reports breastfeeding with on difficulties. Pt states she was having some pain and difficulty w ambulation    Patient seen at bedside resting comfortably offers no current complaints.  Ambulating and voiding without difficulty.  Passing flatus and tolerating regular diet.  both breast/bottle feeding.  Denies HA, CP, SOB, N/V/D, dizziness, palpitations, worsening abdominal pain, worsening vaginal bleeding, or any other concerns.      Vital Signs Last 24 Hrs  T(C): 36.7 (2021 06:36), Max: 37 (2021 18:10)  T(F): 98 (2021 06:36), Max: 98.6 (2021 18:10)  HR: 62 (2021 06:36) (62 - 75)  BP: 121/64 (2021 06:36) (121/64 - 125/80)  BP(mean): --  RR: 17 (2021 06:36) (17 - 18)  SpO2: 98% (2021 06:36) (98% - 99%)    Physical Exam:     Gen: A&Ox 3, NAD  Chest: CTA B/L  Cardiac: S1,S2; RRR  Breast: Soft, nontender, nonengorged  Abdomen: +BS; Soft, nontender, ND; Fundus firm below umbilicus  Gyn: Min lochia  Ext: Nontender, DTRS 2+, no worsening edema                          8.1    12.16 )-----------( 186      ( 2021 06:19 )             25.7        A/P:  PPD# 3 s/p       - Discharge home with instructions  -Follow up in office in 5-6 weeks for postpartum visit  -Breastfeeding encouraged   - Cont PO Analgesia PRN  - encouraged ambulation, inc PO hydration  -d/w dr Nilson Choudhary AGNP-C

## 2021-07-16 NOTE — OB RN DELIVERY SUMMARY - NS_GESTAGEATBIRTHA_OBGYN_ALL_OB_FT
7/23/2021    Patient: Evin Gray   YOB: 1982   Date of Visit: 7/16/2021     Carmen Gresham, [address] [place] [address] [address]  Via In Basket    Dear Carmen Gresham MD,      Thank you for referring Ms. Lyla Haley to [facility] for evaluation. My notes for this consultation are attached. If you have questions, please do not hesitate to call me. I look forward to following your patient along with you.       Sincerely,    Melissa Bethea MD
40w3d

## 2022-02-08 NOTE — DISCHARGE NOTE OB - BREAST MILK IS MORE DIGESTIBLE, MAKING VOMITING, DIARRHEA, GAS AND CONSTIPATION LESS COMMON
Patient : Vicenta Matthews Age: 77 year old Sex: female   MRN: [id number] Encounter Date: 2/8/2022      History     Chief Complaint   Patient presents with   • Dizziness   CATH/EP    HPI  2/8/2022  9:15 AM Vicenta Matthews is a 77 year old female  who presents to the ED for evaluation of dizziness that began 2 weeks ago. The pt states she has been feeling dizzy, so today, she went to her PCP who subsequently sent her to the ED for bradycardia. Per EMS, the pt's HR was 30-34 BPM upon arrival. The patient also reports feeling light-headed and losing her balance. The pt also states she had a fall on July 17, 2021 and broke her left wrist, which she has recently had surgery to fix. The patient denies LOC, chest pain, SOB, back pain, or abd pain. There are no further complaints or modifying factors at this time.     PCP: Doris Greene MD      Allergies   Allergen Reactions   • Codeine HIVES, SWELLING and THROAT SWELLING   • Erythromycin Other (See Comments)     phlebitis   • Fentanyl DIZZINESS   • Morphine ANXIETY and VISUAL DISTURBANCE   • Oxycodone-Acetaminophen VOMITING   • Propoxyphene DIZZINESS       Current Discharge Medication List      Prior to Admission Medications    Details   traMADol (ULTRAM) 50 MG tablet Take 50 mg by mouth every 6 hours as needed for Pain.      fentaNYL (DURAGESIC) 100 MCG/HR patch Place 1 patch onto the skin every 72 hours.           Social History     Tobacco Use   • Smoking status: Not on file   • Smokeless tobacco: Not on file   Substance Use Topics   • Alcohol use: Not on file   • Drug use: Not on file       No existing history information found.  No existing history information found.    Review of Systems   Constitutional: Negative for chills and fever.   HENT: Negative for congestion, rhinorrhea and trouble swallowing.    Eyes: Negative for pain and visual disturbance.   Respiratory: Negative for cough, chest tightness and shortness of breath.    Cardiovascular: Negative for  chest pain, palpitations and leg swelling.        Positive for bradycardia    Gastrointestinal: Negative for abdominal pain, constipation, diarrhea, nausea and vomiting.   Genitourinary: Negative for difficulty urinating, dysuria, frequency, hematuria, urgency, vaginal bleeding and vaginal discharge.   Musculoskeletal: Negative for back pain, neck pain and neck stiffness.   Skin: Negative for rash.   Neurological: Positive for dizziness and light-headedness. Negative for syncope, weakness, numbness and headaches.        Positive for loss of balance   All other systems reviewed and are negative.      Physical Exam     ED Triage Vitals   ED Triage Vitals Group      Temp 02/08/22 0911 97.7 °F (36.5 °C)      Heart Rate 02/08/22 0911 (!) 37      Resp 02/08/22 0911 20      BP 02/08/22 0929 (!) 160/78      SpO2 02/08/22 0911 96 %      EtCO2 mmHg --       Height 02/08/22 0911 5' 8.5\" (1.74 m)      Weight 02/08/22 0911 208 lb 15.9 oz (94.8 kg)      Weight Scale Used 02/08/22 0911 Scale in bed      BMI (Calculated) 02/08/22 0911 31.32      IBW/kg (Calculated) 02/08/22 0911 65.05       Physical Exam  Vitals and nursing note reviewed.   Constitutional:       General: She is not in acute distress.     Appearance: She is well-developed.   HENT:      Head: Normocephalic and atraumatic.      Right Ear: External ear normal.      Left Ear: External ear normal.      Nose: Nose normal.   Eyes:      General:         Right eye: No discharge.         Left eye: No discharge.      Conjunctiva/sclera: Conjunctivae normal.   Cardiovascular:      Rate and Rhythm: Regular rhythm. Bradycardia present.      Pulses:           Radial pulses are 2+ on the right side and 2+ on the left side.        Dorsalis pedis pulses are 2+ on the right side and 2+ on the left side.        Posterior tibial pulses are 2+ on the right side and 2+ on the left side.      Heart sounds: Normal heart sounds. No murmur heard.    No friction rub. No gallop.   Pulmonary:       Effort: Pulmonary effort is normal. No respiratory distress.      Breath sounds: Normal breath sounds. No stridor. No wheezing or rales.   Chest:      Chest wall: No tenderness.   Abdominal:      General: Bowel sounds are normal. There is no distension.      Palpations: Abdomen is soft. There is no mass.      Tenderness: There is no abdominal tenderness. There is no guarding or rebound.   Musculoskeletal:         General: No tenderness. Normal range of motion.      Cervical back: Normal range of motion and neck supple.      Comments: Left wrist is in a splint   Skin:     General: Skin is warm and dry.      Capillary Refill: Capillary refill takes less than 2 seconds.      Findings: No rash.   Neurological:      Mental Status: She is alert and oriented to person, place, and time.      GCS: GCS eye subscore is 4. GCS verbal subscore is 5. GCS motor subscore is 6.      Comments: Face symmetric.  Moves all extremities.   Psychiatric:         Mood and Affect: Mood normal.         Behavior: Behavior normal.         ED Course     Procedures    Lab Results     Results for orders placed or performed during the hospital encounter of 02/08/22   Magnesium   Result Value Ref Range    Magnesium 2.2 1.7 - 2.4 mg/dL   Basic Metabolic Panel   Result Value Ref Range    Fasting Status      Sodium 140 135 - 145 mmol/L    Potassium 3.9 3.4 - 5.1 mmol/L    Chloride 108 (H) 98 - 107 mmol/L    Carbon Dioxide 25 21 - 32 mmol/L    Anion Gap 11 10 - 20 mmol/L    Glucose 94 70 - 99 mg/dL    BUN 22 (H) 6 - 20 mg/dL    Creatinine 0.81 0.51 - 0.95 mg/dL    Glomerular Filtration Rate 70 >=60    BUN/ Creatinine Ratio 27 (H) 7 - 25    Calcium 9.2 8.4 - 10.2 mg/dL   Prothrombin Time   Result Value Ref Range    Prothrombin Time 11.9 (H) 9.7 - 11.8 sec    INR 1.1     Partial Thromboplastin Time   Result Value Ref Range    PTT 32 (H) 22 - 30 sec   CBC with Automated Differential (performable only)   Result Value Ref Range    WBC 5.2 4.2 - 11.0 K/mcL     RBC 4.29 4.00 - 5.20 mil/mcL    HGB 13.7 12.0 - 15.5 g/dL    HCT 42.1 36.0 - 46.5 %    MCV 98.1 78.0 - 100.0 fl    MCH 31.9 26.0 - 34.0 pg    MCHC 32.5 32.0 - 36.5 g/dL    RDW-CV 14.6 11.0 - 15.0 %    RDW-SD 52.7 (H) 39.0 - 50.0 fL    PLT 94 (L) 140 - 450 K/mcL    NRBC 0 <=0 /100 WBC    Neutrophil, Percent 64 %    Lymphocytes, Percent 27 %    Mono, Percent 7 %    Eosinophils, Percent 1 %    Basophils, Percent 1 %    Immature Granulocytes 0 %    Absolute Neutrophils 3.3 1.8 - 7.7 K/mcL    Absolute Lymphocytes 1.4 1.0 - 4.0 K/mcL    Absolute Monocytes 0.4 0.3 - 0.9 K/mcL    Absolute Eosinophils  0.0 0.0 - 0.5 K/mcL    Absolute Basophils 0.0 0.0 - 0.3 K/mcL    Absolute Immmature Granulocytes 0.0 0.0 - 0.2 K/mcL   Thyroid Stimulating Hormone Reflex   Result Value Ref Range    TSH 6.245 (H) 0.350 - 5.000 mcUnits/mL   Free T4   Result Value Ref Range    T4, Free 1.4 0.8 - 1.5 ng/dL   TYPE/SCREEN   Result Value Ref Range    ABO/RH(D) O Rh Positive     ANTIBODY SCREEN Negative     TYPE AND SCREEN EXPIRATION DATE 02/11/2022 23:59    Rapid SARS-CoV-2 by PCR    Specimen: Nasal, Mid-turbinate; Swab   Result Value Ref Range    Rapid SARS-COV-2 by PCR Not Detected Not Detected / Detected / Presumptive Positive / Inhibitors present    Isolation Guidelines      Procedural Comment     TROPONIN I  POINT OF CARE   Result Value Ref Range    TROPONIN I - POINT OF CARE <0.10 <0.10 ng/mL       EKG Results     Results for orders placed or performed during the hospital encounter of 02/08/22   Electrocardiogram 12-Lead   Result Value Ref Range    Ventricular Rate EKG/Min (BPM) 36     Atrial Rate (BPM) 36     HI-Interval (MSEC) 172     QRS-Interval (MSEC) 142     QT-Interval (MSEC) 590     QTc 457     P Axis (Degrees) 67     R Axis (Degrees) -71     T Axis (Degrees) 25     REPORT TEXT       Normal sinus rhythm  Mobitz II AV block   Left axis deviation  Right bundle branch block  Abnormal ECG  No previous ECGs available  Reconfirmed by  MD DENYS, GINO RUELAS ([id number]),  Linda Gannon ([id number]) on 2/8/2022 9:37:25 AM           Radiology Results     Imaging Results          XR CHEST AP OR PA - PORTABLE (Final result)  Result time 02/08/22 10:32:54    Final result                 Impression:    IMPRESSION:    No acute cardiopulmonary disease.    I, Attending Radiologist Nathaniel Perea MD, have reviewed the images and  report and concur with these findings interpreted by Resident Radiologist,  Bautista Bonds MD.              Narrative:    EXAM: XR CHEST AP OR PA    CLINICAL INDICATION: bradycardia.    TECHNIQUE: Portable frontal upright view of the chest.    COMPARISON: None available.    FINDINGS:    The cardiomediastinal silhouette appears to be within normal limits.  Pulmonary vasculature is normally distributed. The lungs and pleural spaces  are clear.                    Preliminary result                 Impression:      No acute cardiopulmonary disease.             Narrative:    EXAM: XR CHEST AP OR PA    CLINICAL INDICATION: bradycardia.    TECHNIQUE: Portable frontal upright view of the chest.    COMPARISON: None available.    FINDINGS:    The cardiomediastinal silhouette appears to be within normal limits.  Pulmonary vasculature is normally distributed. The lungs and pleural   spaces  are clear.                                  ED Medication Orders (From admission, onward)    None          ED Course as of 02/08/22 1310   Tue Feb 08, 2022   1028 TSH(!): 6.245 [CL]      ED Course User Index  [CL] Gino Macias MD       [facility]  Vitals  Vitals:    02/08/22 0929 02/08/22 1003 02/08/22 1031 02/08/22 1056   BP: (!) 160/78 (!) 163/82 (!) 168/71 (!) 190/78   Pulse: (!) 31 (!) 28 (!) 29 (!) 30   Resp: 18 14 15 19   Temp:       TempSrc:       SpO2: 96% 96% 96% 97%   Weight:       Height:           ED Course  Initial impression: Vicenta Matthews is 77 year old presenting to the ED with bradycardia and dizziness. Given the pt's sinus bradycardia  upon arrival, plan for blood work and chest X-Ray.    10:49 AM I spoke with Dr. Paredes (Cardiology) regarding the patient's presentation, and the ED work up. Dr. Paredes suggests the pt stay in the ED and receive a stat echocardiogram. He will come to evaluate the pt and take her to same day surgery to receive a pacemaker and return home afterwards.    11:15 AM I rechecked the pt who is resting comfortably in bed. I updated the pt on my conversation with Dr. Paredes, stating the pt will need surgery for a pacemaker. We discussed the plan for continued care in the ED until her same day surgery. Pt understands and agrees with the plan. All questions addressed.         MDM  Critical Care    Due to the presence of bradycardia my attendance to this patient required critical care time of 40 minutes, including assessment/reassessment, documentation, ordering and interpreting ancillary studies, discussion with ED staff and consultants, patient and their family, and excludes time spent on separately billable procedures.    Clinical Impression  ED Diagnosis        Final diagnosis    AV block, 2nd degree                The patient was provided with a recommendation to follow up with a primary care provider and obtain reassessment of his/her blood pressure within three months.    Follow Up:  Jeromy Paredes MD  [address]  [facility]  [address]  [facility] [id number] [phone]      today for pacemaker placement          Summary of your Discharge Medications      Take these Medications      Details   acetaminophen 500 MG tablet  Commonly known as: TYLENOL   Take 1,000 mg by mouth every 8 hours as needed for Pain.     Calcium 600-200 MG-UNIT Tab   Take 1 tablet by mouth 2 times daily.     levothyroxine 100 MCG tablet   Take 100 mcg by mouth daily.     traMADol 50 MG tablet  Commonly known as: ULTRAM   Take 50 mg by mouth every 6 hours as needed for Pain.     Vitamin D3 50 mcg (2,000 units) capsule   Take 50 mcg by mouth daily.             Pt is discharged to same day surgery for pacemaker placement now in stable condition.       I have reviewed the information recorded by the scribe for accuracy and agree with its contents.    ____________________________________________________________________    Linda Gannon acting as a scribe for Dr. Gino Macias  Dictation # [id number]  Scribe: Linda Macias MD  02/08/22 1313     Statement Selected

## 2022-08-19 PROBLEM — Z00.00 ENCOUNTER FOR PREVENTIVE HEALTH EXAMINATION: Status: ACTIVE | Noted: 2022-08-19

## 2022-08-25 ENCOUNTER — LABORATORY RESULT (OUTPATIENT)
Age: 37
End: 2022-08-25

## 2022-08-25 ENCOUNTER — APPOINTMENT (OUTPATIENT)
Dept: ANTEPARTUM | Facility: CLINIC | Age: 37
End: 2022-08-25

## 2022-08-25 ENCOUNTER — ASOB RESULT (OUTPATIENT)
Age: 37
End: 2022-08-25

## 2022-08-25 PROCEDURE — 76813 OB US NUCHAL MEAS 1 GEST: CPT

## 2022-08-25 PROCEDURE — 36415 COLL VENOUS BLD VENIPUNCTURE: CPT

## 2022-08-25 PROCEDURE — 76801 OB US < 14 WKS SINGLE FETUS: CPT | Mod: 59

## 2022-10-10 ENCOUNTER — APPOINTMENT (OUTPATIENT)
Dept: ANTEPARTUM | Facility: CLINIC | Age: 37
End: 2022-10-10

## 2022-10-10 ENCOUNTER — ASOB RESULT (OUTPATIENT)
Age: 37
End: 2022-10-10

## 2022-10-10 PROCEDURE — 76811 OB US DETAILED SNGL FETUS: CPT

## 2022-12-26 NOTE — OB PROVIDER TRIAGE NOTE - NSOBPROVIDERNOTE_OBGYN_ALL_OB_FT
37 yo  @ 40.2 wks c/o contractions increase in frequency and intensity since this afternoon. contractions started at 0230 seen in D&T and discharged home. denies vb or lof, reports +GFM. AP course complicated by + GBS, HSV1- cold sores, anemia, yeast infection last week. denies fever chills dysuria n/v ha new swelling vision changes cp sob cough. last seen by OB tuesday VE 0.5, EFW 8#0. scheduled for induction 21.    GBS: Positive  meds: PNV Valtrex, iron  all: denies  PMH: thalessemia trait, anemia  PSH: ob procedure in office for urinary cyst?   gyn hx: denies  ob hx: denies    d/w Dr Mcqueen admit for labor @ 40.2 wks  Ampicillin for +GBS  for epidural for pain  prenatals reviewed  covid swab sent  repeat VE 02-Aug-2022

## 2023-02-21 ENCOUNTER — TRANSCRIPTION ENCOUNTER (OUTPATIENT)
Age: 38
End: 2023-02-21

## 2023-02-21 ENCOUNTER — INPATIENT (INPATIENT)
Facility: HOSPITAL | Age: 38
LOS: 1 days | Discharge: HOME | End: 2023-02-23
Attending: OBSTETRICS & GYNECOLOGY | Admitting: OBSTETRICS & GYNECOLOGY

## 2023-02-21 VITALS — HEART RATE: 86 BPM | DIASTOLIC BLOOD PRESSURE: 67 MMHG | TEMPERATURE: 98 F | SYSTOLIC BLOOD PRESSURE: 116 MMHG

## 2023-02-21 DIAGNOSIS — Z87.59 PERSONAL HISTORY OF OTHER COMPLICATIONS OF PREGNANCY, CHILDBIRTH AND THE PUERPERIUM: ICD-10-CM

## 2023-02-21 DIAGNOSIS — N85.8 OTHER SPECIFIED NONINFLAMMATORY DISORDERS OF UTERUS: Chronic | ICD-10-CM

## 2023-02-21 DIAGNOSIS — O40.3XX0 POLYHYDRAMNIOS, THIRD TRIMESTER, NOT APPLICABLE OR UNSPECIFIED: ICD-10-CM

## 2023-02-21 DIAGNOSIS — Z3A.39 39 WEEKS GESTATION OF PREGNANCY: ICD-10-CM

## 2023-02-21 LAB
BASOPHILS # BLD AUTO: 0.02 K/UL — SIGNIFICANT CHANGE UP (ref 0–0.2)
BASOPHILS NFR BLD AUTO: 0.2 % — SIGNIFICANT CHANGE UP (ref 0–2)
BLD GP AB SCN SERPL QL: NEGATIVE — SIGNIFICANT CHANGE UP
COVID-19 SPIKE DOMAIN AB INTERP: POSITIVE
COVID-19 SPIKE DOMAIN ANTIBODY RESULT: >250 U/ML — HIGH
EOSINOPHIL # BLD AUTO: 0.07 K/UL — SIGNIFICANT CHANGE UP (ref 0–0.5)
EOSINOPHIL NFR BLD AUTO: 0.8 % — SIGNIFICANT CHANGE UP (ref 0–6)
HCT VFR BLD CALC: 34.3 % — LOW (ref 34.5–45)
HGB BLD-MCNC: 10.7 G/DL — LOW (ref 11.5–15.5)
IANC: 6.95 K/UL — SIGNIFICANT CHANGE UP (ref 1.8–7.4)
IMM GRANULOCYTES NFR BLD AUTO: 0.7 % — SIGNIFICANT CHANGE UP (ref 0–0.9)
LYMPHOCYTES # BLD AUTO: 1.36 K/UL — SIGNIFICANT CHANGE UP (ref 1–3.3)
LYMPHOCYTES # BLD AUTO: 14.8 % — SIGNIFICANT CHANGE UP (ref 13–44)
MCHC RBC-ENTMCNC: 23.5 PG — LOW (ref 27–34)
MCHC RBC-ENTMCNC: 31.2 GM/DL — LOW (ref 32–36)
MCV RBC AUTO: 75.2 FL — LOW (ref 80–100)
MONOCYTES # BLD AUTO: 0.71 K/UL — SIGNIFICANT CHANGE UP (ref 0–0.9)
MONOCYTES NFR BLD AUTO: 7.7 % — SIGNIFICANT CHANGE UP (ref 2–14)
NEUTROPHILS # BLD AUTO: 6.95 K/UL — SIGNIFICANT CHANGE UP (ref 1.8–7.4)
NEUTROPHILS NFR BLD AUTO: 75.8 % — SIGNIFICANT CHANGE UP (ref 43–77)
NRBC # BLD: 0 /100 WBCS — SIGNIFICANT CHANGE UP (ref 0–0)
NRBC # FLD: 0.05 K/UL — HIGH (ref 0–0)
PLATELET # BLD AUTO: 268 K/UL — SIGNIFICANT CHANGE UP (ref 150–400)
RBC # BLD: 4.56 M/UL — SIGNIFICANT CHANGE UP (ref 3.8–5.2)
RBC # FLD: 14.6 % — HIGH (ref 10.3–14.5)
RH IG SCN BLD-IMP: POSITIVE — SIGNIFICANT CHANGE UP
SARS-COV-2 IGG+IGM SERPL QL IA: >250 U/ML — HIGH
SARS-COV-2 IGG+IGM SERPL QL IA: POSITIVE
T PALLIDUM AB TITR SER: NEGATIVE — SIGNIFICANT CHANGE UP
WBC # BLD: 9.17 K/UL — SIGNIFICANT CHANGE UP (ref 3.8–10.5)
WBC # FLD AUTO: 9.17 K/UL — SIGNIFICANT CHANGE UP (ref 3.8–10.5)

## 2023-02-21 RX ORDER — DIBUCAINE 1 %
1 OINTMENT (GRAM) RECTAL EVERY 6 HOURS
Refills: 0 | Status: DISCONTINUED | OUTPATIENT
Start: 2023-02-21 | End: 2023-02-23

## 2023-02-21 RX ORDER — OXYCODONE HYDROCHLORIDE 5 MG/1
5 TABLET ORAL
Refills: 0 | Status: DISCONTINUED | OUTPATIENT
Start: 2023-02-21 | End: 2023-02-23

## 2023-02-21 RX ORDER — OXYCODONE HYDROCHLORIDE 5 MG/1
5 TABLET ORAL ONCE
Refills: 0 | Status: DISCONTINUED | OUTPATIENT
Start: 2023-02-21 | End: 2023-02-23

## 2023-02-21 RX ORDER — CITRIC ACID/SODIUM CITRATE 300-500 MG
15 SOLUTION, ORAL ORAL EVERY 6 HOURS
Refills: 0 | Status: DISCONTINUED | OUTPATIENT
Start: 2023-02-21 | End: 2023-02-21

## 2023-02-21 RX ORDER — LANOLIN
1 OINTMENT (GRAM) TOPICAL EVERY 6 HOURS
Refills: 0 | Status: DISCONTINUED | OUTPATIENT
Start: 2023-02-21 | End: 2023-02-23

## 2023-02-21 RX ORDER — OXYTOCIN 10 UNIT/ML
333.33 VIAL (ML) INJECTION
Qty: 20 | Refills: 0 | Status: COMPLETED | OUTPATIENT
Start: 2023-02-21 | End: 2023-02-21

## 2023-02-21 RX ORDER — MAGNESIUM HYDROXIDE 400 MG/1
30 TABLET, CHEWABLE ORAL
Refills: 0 | Status: DISCONTINUED | OUTPATIENT
Start: 2023-02-21 | End: 2023-02-23

## 2023-02-21 RX ORDER — AER TRAVELER 0.5 G/1
1 SOLUTION RECTAL; TOPICAL EVERY 4 HOURS
Refills: 0 | Status: DISCONTINUED | OUTPATIENT
Start: 2023-02-21 | End: 2023-02-23

## 2023-02-21 RX ORDER — AMPICILLIN TRIHYDRATE 250 MG
1 CAPSULE ORAL EVERY 4 HOURS
Refills: 0 | Status: DISCONTINUED | OUTPATIENT
Start: 2023-02-21 | End: 2023-02-21

## 2023-02-21 RX ORDER — TETANUS TOXOID, REDUCED DIPHTHERIA TOXOID AND ACELLULAR PERTUSSIS VACCINE, ADSORBED 5; 2.5; 8; 8; 2.5 [IU]/.5ML; [IU]/.5ML; UG/.5ML; UG/.5ML; UG/.5ML
0.5 SUSPENSION INTRAMUSCULAR ONCE
Refills: 0 | Status: DISCONTINUED | OUTPATIENT
Start: 2023-02-21 | End: 2023-02-23

## 2023-02-21 RX ORDER — BENZOCAINE 10 %
1 GEL (GRAM) MUCOUS MEMBRANE EVERY 6 HOURS
Refills: 0 | Status: DISCONTINUED | OUTPATIENT
Start: 2023-02-21 | End: 2023-02-23

## 2023-02-21 RX ORDER — DIPHENHYDRAMINE HCL 50 MG
25 CAPSULE ORAL EVERY 6 HOURS
Refills: 0 | Status: DISCONTINUED | OUTPATIENT
Start: 2023-02-21 | End: 2023-02-23

## 2023-02-21 RX ORDER — SODIUM CHLORIDE 9 MG/ML
3 INJECTION INTRAMUSCULAR; INTRAVENOUS; SUBCUTANEOUS EVERY 8 HOURS
Refills: 0 | Status: DISCONTINUED | OUTPATIENT
Start: 2023-02-21 | End: 2023-02-23

## 2023-02-21 RX ORDER — PRAMOXINE HYDROCHLORIDE 150 MG/15G
1 AEROSOL, FOAM RECTAL EVERY 4 HOURS
Refills: 0 | Status: DISCONTINUED | OUTPATIENT
Start: 2023-02-21 | End: 2023-02-23

## 2023-02-21 RX ORDER — IBUPROFEN 200 MG
600 TABLET ORAL EVERY 6 HOURS
Refills: 0 | Status: COMPLETED | OUTPATIENT
Start: 2023-02-21 | End: 2024-01-20

## 2023-02-21 RX ORDER — PRAMOXINE HYDROCHLORIDE 150 MG/15G
1 AEROSOL, FOAM RECTAL
Qty: 0 | Refills: 0 | DISCHARGE
Start: 2023-02-21

## 2023-02-21 RX ORDER — CHLORHEXIDINE GLUCONATE 213 G/1000ML
1 SOLUTION TOPICAL ONCE
Refills: 0 | Status: DISCONTINUED | OUTPATIENT
Start: 2023-02-21 | End: 2023-02-21

## 2023-02-21 RX ORDER — OXYTOCIN 10 UNIT/ML
2 VIAL (ML) INJECTION
Qty: 30 | Refills: 0 | Status: DISCONTINUED | OUTPATIENT
Start: 2023-02-21 | End: 2023-02-21

## 2023-02-21 RX ORDER — ACETAMINOPHEN 500 MG
975 TABLET ORAL
Refills: 0 | Status: DISCONTINUED | OUTPATIENT
Start: 2023-02-21 | End: 2023-02-23

## 2023-02-21 RX ORDER — HYDROCORTISONE 1 %
1 OINTMENT (GRAM) TOPICAL EVERY 6 HOURS
Refills: 0 | Status: DISCONTINUED | OUTPATIENT
Start: 2023-02-21 | End: 2023-02-23

## 2023-02-21 RX ORDER — AMPICILLIN TRIHYDRATE 250 MG
2 CAPSULE ORAL ONCE
Refills: 0 | Status: COMPLETED | OUTPATIENT
Start: 2023-02-21 | End: 2023-02-21

## 2023-02-21 RX ORDER — IBUPROFEN 200 MG
600 TABLET ORAL EVERY 6 HOURS
Refills: 0 | Status: DISCONTINUED | OUTPATIENT
Start: 2023-02-21 | End: 2023-02-23

## 2023-02-21 RX ORDER — OXYTOCIN 10 UNIT/ML
41.67 VIAL (ML) INJECTION
Qty: 20 | Refills: 0 | Status: DISCONTINUED | OUTPATIENT
Start: 2023-02-21 | End: 2023-02-23

## 2023-02-21 RX ORDER — SODIUM CHLORIDE 9 MG/ML
1000 INJECTION, SOLUTION INTRAVENOUS
Refills: 0 | Status: DISCONTINUED | OUTPATIENT
Start: 2023-02-21 | End: 2023-02-21

## 2023-02-21 RX ORDER — SIMETHICONE 80 MG/1
80 TABLET, CHEWABLE ORAL EVERY 4 HOURS
Refills: 0 | Status: DISCONTINUED | OUTPATIENT
Start: 2023-02-21 | End: 2023-02-23

## 2023-02-21 RX ORDER — KETOROLAC TROMETHAMINE 30 MG/ML
30 SYRINGE (ML) INJECTION ONCE
Refills: 0 | Status: DISCONTINUED | OUTPATIENT
Start: 2023-02-21 | End: 2023-02-21

## 2023-02-21 RX ADMIN — Medication 108 GRAM(S): at 11:03

## 2023-02-21 RX ADMIN — Medication 200 GRAM(S): at 07:07

## 2023-02-21 RX ADMIN — Medication 1000 MILLIUNIT(S)/MIN: at 19:15

## 2023-02-21 RX ADMIN — Medication 2 MILLIUNIT(S)/MIN: at 12:00

## 2023-02-21 RX ADMIN — SODIUM CHLORIDE 125 MILLILITER(S): 9 INJECTION, SOLUTION INTRAVENOUS at 07:07

## 2023-02-21 RX ADMIN — Medication 30 MILLIGRAM(S): at 19:17

## 2023-02-21 RX ADMIN — SODIUM CHLORIDE 125 MILLILITER(S): 9 INJECTION, SOLUTION INTRAVENOUS at 08:33

## 2023-02-21 RX ADMIN — SODIUM CHLORIDE 3 MILLILITER(S): 9 INJECTION INTRAMUSCULAR; INTRAVENOUS; SUBCUTANEOUS at 21:00

## 2023-02-21 NOTE — OB PROVIDER H&P - ASSESSMENT
38y  yo  EDC @39+2 weeks gestation who presented for scheduled elective IOL 2/2 hx of shoulder dystocia in previous delivery.     Plan:    Risks, benefits, alternatives and possible complications have been discussed in detail with the patient in english (preferred language). Pre-admission, admission, and post admission procedures and expectations were discussed in detail. All questions answered, all appropriate hospital consents were signed.  Anticipate normal vaginal delivery.     Informed Consent was obtained. The following was discussed:     Induction/Augmentation of Labor: Use of medication and/or cook balloon to begin or enhance labor  Obstetrical management including internal fetal/contraction monitoring  Normal vagina delivery  Possible  Section: (surgical cut in the abdomen in order to deliver the baby)  Counseled by Dr Allred; not recommended for pCS    - Admit to L&D  - cEFM  - Clear diet, IV fluids  - Consent signed  - Standard labs (T&S, CBC, RPR, and Covid PCR and serology)  - Epidural PRN  - Induction/augmentation agent: for cervical balloon only at this time pt ctx q2-4min  -shoulder precautions  -DW Dr Brendon Rose, KATERIN

## 2023-02-21 NOTE — OB RN DELIVERY SUMMARY - NSSELHIDDEN_OBGYN_ALL_OB_FT
[NS_DeliveryAttending1_OBGYN_ALL_OB_FT:[id number]],[NS_DeliveryAssist1_OBGYN_ALL_OB_FT:[id number]],[NS_DeliveryRN_OBGYN_ALL_OB_FT:[id number]]

## 2023-02-21 NOTE — LACTATION INITIAL EVALUATION - LACTATION INTERVENTIONS
initiate/review safe skin-to-skin/initiate/review hand expression/initiate/review techniques for position and latch/review techniques to increase milk supply/initiate/review breast massage/compression/reviewed components of an effective feeding and at least 8 effective feedings per day required/reviewed importance of monitoring infant diapers, the breastfeeding log, and minimum output each day/reviewed risks of unnecessary formula supplementation/reviewed benefits and recommendations for rooming in/reviewed feeding on demand/by cue at least 8 times a day/reviewed indications of inadequate milk transfer that would require supplementation

## 2023-02-21 NOTE — OB PROVIDER H&P - NSHPPHYSICALEXAM_GEN_ALL_CORE
Objective:    Vitals:T(C): 36.9 (02-21-23 @ 06:38), Max: 36.9 (02-21-23 @ 06:26)  HR: 86 (02-21-23 @ 06:38) (86 - 86)  BP: 116/67 (02-21-23 @ 06:38) (116/67 - 116/67)         General:	NAD  Lungs:  CTAB  Heart: RRR  Abdomen: soft, gravid, non-tender, non-distended    FHT:   Bone Gap: contractions every 2-4min  vtx confirmed  EFW 7#8 1-2 weeks ago

## 2023-02-21 NOTE — OB PROVIDER LABOR PROGRESS NOTE - ASSESSMENT
CNM OB Progress Note    Patient seen and evaluated at bedside.  Reports tolerable contractions.    T(C): 36.9 (02-21-23 @ 10:34), Max: 36.9 (02-21-23 @ 06:26)  HR: 78 (02-21-23 @ 10:33) (78 - 86)  BP: 125/71 (02-21-23 @ 10:33) (115/68 - 125/71)  RR: 16 (02-21-23 @ 10:34) (16 - 16)  SpO2: --    SVE: 4/60/-3, intact      A/P 38y P1 @ 39.3wks admitted for rrIOL for polyhydramnios     -Labor: cat 1 tracing    -s/p Buccal cytotec dose x1  -plan to start IV Pitocin now  -reposition PRN  -approved for anesthesia epidural once desired    -Continue to monitor with EFM & TOCO  -Recheck patient in 2-4 hours or PRN    Discussed with MD Brendon Barone  
Patient just laid down from anesthesia epidural placement. Patietn reporting constant rectal pressure and urge to push.     cat 1 tracing  repositioned to left lateral   urinary solorio placed now  advanced VE  has urge to push and feeling pressure    recheck in 1-2 hours or PRN  Continue to monitor with EFM & TOCO    Discussed with MD Brendon Barone
CNM OB Progress Note    Patient seen and evaluated at bedside.  Denies complaints, reports having contractions every 10 minutes, tolerable.     T(C): 36.7 (02-21-23 @ 07:45), Max: 36.9 (02-21-23 @ 06:26)  HR: 81 (02-21-23 @ 08:21) (81 - 86)  BP: 115/68 (02-21-23 @ 08:21) (115/68 - 116/67)  RR: 16 (02-21-23 @ 07:45) (16 - 16)  SpO2: --    SVE: 3.5/50/-3    A/P 38y P1 @ 39.3wks admitted for rrIOL for polyhydramnios      -Labor: cat 1 tracing  -Fetus: EFW 7#9  -GBS positive  -Analgesia: none  -Induction with: Cytotec    Patient to receive one dose of buccal cytotec and then reevaluate  consider starting IV Pitocin in 3 hours  CB discussed and consider, but due to advanced VE, CB not attempted, patient made aware  Buccal cytotec 25mcg dose to be given now  reposition PRN    -Continue to monitor with EFM & TOCO  -Recheck patient in 2-4 hours or PRN    Discussed with MD Brendon Barone  
CNM OB Progress Note    Patient seen and evaluated at bedside.  Reports painful contractions. Requesting pain intervention, desires anesthesia epidural.      T(C): 37.1 (02-21-23 @ 14:11), Max: 37.1 (02-21-23 @ 14:11)  HR: 74 (02-21-23 @ 14:10) (74 - 86)  BP: 119/55 (02-21-23 @ 13:42) (110/55 - 125/71)  RR: 16 (02-21-23 @ 14:11) (16 - 16)  SpO2: 96% (02-21-23 @ 14:10) (96% - 98%)    SVE: 4.5/70/-2    -Labor: cat 1 tracing  -GBS positive  -Induction with: IV Pitocin    patient sitting in high fowlers position  requesting pain intervention, anesthesia MD called with request, pending anesthesia consult  AROM discussed with patient, patient agreed to AROM  AROM, clear fluids  IV Pitocin continues at 6u, continue titrating for category 1 tracing    -Continue to monitor with EFM & TOCO  -Recheck patient in 2-4 hours or PRN    Discussed with MD Brendon Barone

## 2023-02-21 NOTE — DISCHARGE NOTE OB - PATIENT PORTAL LINK FT
You can access the FollowMyHealth Patient Portal offered by [facility] by registering at the following website: [email or web address]. By joining [employer]’s FollowMyHealth portal, you will also be able to view your health information using other applications (apps) compatible with our system.

## 2023-02-21 NOTE — DISCHARGE NOTE OB - NS MD DC FALL RISK RISK
For information on Fall & Injury Prevention, visit: [email or web address] OR  [email or web address] OR  https://www.cdc.gov/steadi/patient.html

## 2023-02-21 NOTE — OB PROVIDER DELIVERY SUMMARY - NSLOWPPHRISK_OBGYN_A_OB
No previous uterine incision/Bhatia Pregnancy/Less than or equal to 4 previous vaginal births/No known bleeding disorder

## 2023-02-21 NOTE — OB PROVIDER DELIVERY SUMMARY - NSPROVIDERDELIVERYNOTE_OBGYN_ALL_OB_FT
Patient found to be fully dilated and directed to push.  Spontaneous vaginal delivery of viable liveborn male infant.   Head, shoulders, and body delivered without complications.  Infant was suctioned and passed to mother.   Delayed cord clamping performed, then clamped and cut.   Placenta delivered intact with a 3-vessel cord.   Fundal massage was given and uterine fundus was noted to be firm.   Vaginal exam revealed an intact cervix, vaginal walls, and sulci.   Patient has a second degree laceration that was repaired with 2-0 chromic suture.   Excellent hemostasis noted.  Patient was stable and started postpartum recovery in room.   Count was correct x 2.     Infant: male  Apgar: 9/9  QBL: 194cc  Weight: 8.6

## 2023-02-21 NOTE — DISCHARGE NOTE OB - CARE PROVIDER_API CALL
Estrella Olivas (DO)  Obstetrics and Gynecology  [address] [place] [address]  Phone: [phone]  Follow Up Time:

## 2023-02-21 NOTE — DISCHARGE NOTE OB - CARE PLAN
Assessment and plan of treatment:	routine pp care   1 Principal Discharge DX:	 (normal spontaneous vaginal delivery)  Assessment and plan of treatment:	routine pp care

## 2023-02-21 NOTE — OB PROVIDER H&P - HISTORY OF PRESENT ILLNESS
Prenatal care: [facility]   is a 38y  yo  EDC @39+2 weeks gestation who presented for scheduled elective IOL 2/2 hx of shoulder dystocia in previous delivery. + Intermittent ctx pain. Denies LOF/VB. Endorses +FM. COVID vacinnated and PCR negative .     issues:  GBS positive in urine  Alphar       Prenatal labs:         Ultrasounds:   Dating/NT:   Anatomy:   Most recent:      Ob Hx:     Gyn Hx:     Past Medical History:       Past Surgical History:     Family History:      Medications: ampicillin  IVPB 1 Gram(s) IV Intermittent every 4 hours  chlorhexidine 2% Cloths 1 Application(s) Topical once  citric acid/sodium citrate Solution 15 milliLiter(s) Oral every 6 hours  lactated ringers. 1000 milliLiter(s) IV Continuous <Continuous>  oxytocin Infusion 333.333 milliUNIT(s)/Min IV Continuous <Continuous>        Allergies: No Known Allergies        Social History:       Objective:    Vitals:T(C): 36.9 (23 @ 06:38), Max: 36.9 (23 @ 06:26)  HR: 86 (23 @ 06:38) (86 - 86)  BP: 116/67 (23 @ 06:38) (116/67 - 116/67)  RR: 16 (23 @ 06:38) (16 - 16)  SpO2: --       General:	  Lungs:   Cor:   Abdomen: soft, gravid, non-tender, non-distended  Pelvic:	  Ext:	    FHT:   [place]: contractions every   Ultrasound: KYRSTAL , placenta   vertex  EFW     Assessment:SKY MELENDEZis a 38y who presented with          Plan:    Risks, benefits, alternatives and possible complications have been discussed in detail with the patient in her native language. Pre-admission, admission, and post admission procedures and expectations were discussed in detail. All questions answered, all appropriate hospital consents were signed.  Anticipate normal vaginal delivery.     Informed Consent was obtained. The following was discussed:     Induction/Augmentation of Labor: Use of medication and/or cook balloon to begin or enhance labor  Obstetrical management including internal fetal/contraction monitoring  Normal vagina delivery  Possible  Section: (surgical cut in the abdomen in order to deliver the baby)  Amnioinfusion: fluid placed in the uterus through the vagina      - Admit to L&D  - cEFM  - Clear diet, IV fluids  - Consent signed  - Standard labs (T&S, CBC, RPR, and Covid PCR and serology)  - Epidural PRN  - Induction/augmentation agent:  - Consider re-examination in 4 hours     Britt Rose NP   [facility] care: [facility]    38y  yo  EDC @39+2 weeks gestation who presented for scheduled elective IOL 2/2 hx of shoulder dystocia in previous delivery. + Intermittent ctx pain. Denies LOF/VB. Endorses +FM. COVID vacinnated and PCR negative .     issues:  -GBS positive UCX   -Alpha Thalassemia Anemia; saw heme in last pregnancy but no IV iron needed  s/p sema4 genetic counseling 10/21 FOB- BW declined   -AMA nips normal  -Hx shoulder dystocia counseled by Dr Allred, not recommended for pCS    Ultrasounds:   Dating/NT: consistent with dates  Anatomy: normal   KRYSTAL 25.88  Most recent: EFW~7#8 1-2 weeks ago     Ob Hx:     21  shoulder dystocia RML episiotomy 9#1 female 40 weeks    Gyn Hx:   denies fibroids abnormal paps STDs  -hsv 1- on valtrex no hx of genital outbreaks    Past Medical History:  alpha thaleseemia anemia    Past Surgical History:  Uterine cystectomy     Medications: PNV, Iron, Valtrex 500bid since 36w    Allergies: No Known Allergies    Social History: declines smoking/drinking/drug use  Reported feelings of anxiety/depression ; not seen by therapist/no meds; reports feeling better since

## 2023-02-21 NOTE — DISCHARGE NOTE OB - MEDICATION SUMMARY - MEDICATIONS TO TAKE
I will START or STAY ON the medications listed below when I get home from the hospital:    acetaminophen 325 mg oral tablet  -- 3 tab(s) by mouth every 8 hours, As Needed  -- Indication: For Pain    ibuprofen 600 mg oral tablet  -- 1 tab(s) by mouth every 6 hours, As Needed  -- Indication: For Pain    pramoxine 1% topical cream  -- 1 application on skin every 4 hours, As needed, Moderate Pain (4-6)  -- Indication: For Perineal pain

## 2023-02-21 NOTE — OB RN PATIENT PROFILE - FALL HARM RISK - UNIVERSAL INTERVENTIONS
Bed in lowest position, wheels locked, appropriate side rails in place/Call bell, personal items and telephone in reach/Instruct patient to call for assistance before getting out of bed or chair/Non-slip footwear when patient is out of bed/[place] to call system/Physically safe environment - no spills, clutter or unnecessary equipment/Purposeful Proactive Rounding/Room/bathroom lighting operational, light cord in reach

## 2023-02-21 NOTE — OB RN DELIVERY SUMMARY - BABYS CARE PROVIDER NAME, OB PROFILE
Consult - Cardiology   Danie Fine 79 y o  female MRN: [id number]  Unit/Bed#: 7T [facility] 705-02 Encounter: [id number]          Reason For Consult:  Abdominal pain, abnormal ECG    History Of Present Illness: The patient is a 79 yr old female cared from by Dr Burden  in [place]    She has known very low HDL cholesterol, RA,  And HTN    She sustained a fall recently and suffered multiple closed fractures of the pelvis  She was in rehab and developed fairly acute right upper quadrant pressure like pain yesterday  Due to EKG changes we were consult  She denies any history of chest pain  She may have had some remote cardiac testing  She does complain of dyspnea on exertion which has been a problem for some time  She denies palpitations or peripheral edema  She denies coughing or wheezing  She does get some positional lightheadedness  She denies orthopnea or PND  The right upper quadrant pain has been fairly constant since yesterday  Past Medical History:   Low HDL cholesterol  Hypertension  Rheumatoid arthritis  Hypothyroidism  Resection of meningioma  History of cholecystectomy  Hypothyroidism  Sjogren syndrome  Tardive dyskinesia    Allergy:  Allergies   Allergen Reactions    Codeine Vomiting    Hydroxyquinoline Sulfate [Oxyquinoline] Vomiting    Leucovorin Vomiting    Tizanidine Vomiting       Medications:-current  Amlodipine 5 mg daily  Atorvastatin 10 mg daily  Atenolol 50 mg b i d  Lovenox  Prozac  Levothyroxine  Methotrexate weekly      Family History:  Premature CAD in the patient's mother    Social History:  Occasional beer  Quit smoking 6 years ago had smoked 1 pack of cigarettes per day        ROS:  A complete 12 system review of systems was performed and is remarkable for the following:  Fatigue, weight gain, multiple arthritic pains as well as back pain  Current rate upper quadrant pain  The aforementioned dyspnea on exertion and positional dizziness    Otherwise the review of systems is negative    Exam:  133/67  Pulse 65  General:  Obese middle-age female in moderate distress,   Head: Normocephalic, atraumatic  Eyes:  No Icterus  Normal Conjunctiva  Oropharynx: normal-appearing mucosa and no pharyngitis, no exudate  Neck: supple, symmetrical, trachea midline, thyroid: not enlarged, symmetric, no tenderness/mass/nodules, no carotid bruit and no JVD   Heart: RRR, No: murmer, rub or gallop,   Respiratory effort:  No respiratory distress seen  Lungs:  Somewhat decreased breath sounds at the right base  No wheezing rhonchi rales  Abdomen: tender in RUQ  [facility] No mass organomegaly noted  Lower Limbs:  No edema noted  Absent dorsalis pedal and posterior tibial pulses  Musculoskeletal:no back or joint deformity  Neurologic: involuntary facial movements    no motor or sensory findings  [facility] oriented  Psych: anxious      Troponins x2 negative  CT scan does show a right pleural effusion with some atelectasis at the right base  Sodium 135, potassium 4 3, BUN 13, creatinine 0 66, glucose 96-AST 64 with alkaline phosphatase 169  Triglycerides 263 with HDL cholesterol 17 and LDL cholesterol of 84  Hemoglobin 9 7, white blood count 10 10, platelets [id number]    EKG:  Sinus rhythm with voltage criteria for LVH, possible inferior infarction age indeterminate, poor R-wave progression, nonspecific T-wave abnormalities and prolonged QT interval    ASSESSMENT AND PLAN:   1  Abnormal EKG  Suspicion of possible inferior infarction  Nonspecific ST T wave abnormalities  Does not appear to have active coronary ischemia and her symptoms clearly noncardiac  To support this is that she has had continuous symptoms with negative troponins and the quality of the pain would be most unusual for angina  At this point however I would do an echocardiogram to see if indeed she has had an inferior infarction  When her current symptoms settle down I think she should have a pharmacologic stress    This can be done Monday of her symptoms are improved but would be difficult now with her distress to perform this  2  Low HDL cholesterol  Patient now on statin therapy  3  Hypertension  Appears to be well controlled on rather high dose atenolol and amlodipine  4  Right pleural effusion  Doubt congestive heart failure  Will add proBNP to next blood work  5  Dyspnea on exertion  Fairly longstanding  Etiology likely noncardiac    Will check proBNP and echo as noted above      Nadeem Driver MD      If no contraindication-would favor enteric-coated baby aspirin 81 mg daily cielo MCKEON

## 2023-02-21 NOTE — OB RN DELIVERY SUMMARY - NS_EXTRAMURALDEL_OBGYN_ALL_OB
Spoke with mom, she was wondering at what temp she should bring child to the ER, temp was 39 C, now is  38.9 C, mom just gave tylenol. Child is acting ok per mom, and is having good wet diapers, drinking, and has no resp difficulty. Told mom if temp is over 04 or above, or child is not acting normally, or not drinking, not having good urine diapers, or has any resp difficulty, then go to the ER, mom verbalized understanding  
No

## 2023-02-21 NOTE — LACTATION INITIAL EVALUATION - INTERVENTION OUTCOME
Utilized Guide to Postpartum and [place] [facility] book as a visual reference for mom to better understand teaching points and to utilize at home  to review the education presented during hospitalization. Encouraged to breastfeed the baby on demand based on cues and at least 8-12 times in a day. Instructed to log feedings along with wet and dirty diapers. Instructed in hand expression with good return demonstration. + colostrum noted. Assisted mom with latch and positioning. Encouraged deeper latch and taught techniques to achieve deep latch. [place] demonstrated a deep latch./verbalizes understanding/demonstrates understanding of teaching/good return demonstration/needs met/Lactation team to follow up

## 2023-02-21 NOTE — OB PROVIDER DELIVERY SUMMARY - NSSELHIDDEN_OBGYN_ALL_OB_FT
[NS_DeliveryAttending1_OBGYN_ALL_OB_FT:[id number]] [NS_DeliveryAttending1_OBGYN_ALL_OB_FT:[id number]],[NS_DeliveryAssist1_OBGYN_ALL_OB_FT:[id number]]

## 2023-02-21 NOTE — OB RN DELIVERY SUMMARY - NS_SEPSISRSKCALC_OBGYN_ALL_OB_FT
EOS calculated successfully. EOS Risk Factor: 0.5/1000 live births ([facility] national incidence); GA=39w3d; Temp=98.78; ROM=1.75; GBS='Positive'; Antibiotics='Broad spectrum antibiotics > 4 hrs prior to birth'

## 2023-02-22 RX ORDER — SENNA PLUS 8.6 MG/1
1 TABLET ORAL
Refills: 0 | Status: DISCONTINUED | OUTPATIENT
Start: 2023-02-22 | End: 2023-02-23

## 2023-02-22 RX ORDER — DOCOSANOL 100 MG/G
1 CREAM TOPICAL
Refills: 0 | Status: DISCONTINUED | OUTPATIENT
Start: 2023-02-22 | End: 2023-02-23

## 2023-02-22 RX ADMIN — SODIUM CHLORIDE 3 MILLILITER(S): 9 INJECTION INTRAMUSCULAR; INTRAVENOUS; SUBCUTANEOUS at 15:07

## 2023-02-22 RX ADMIN — SODIUM CHLORIDE 3 MILLILITER(S): 9 INJECTION INTRAMUSCULAR; INTRAVENOUS; SUBCUTANEOUS at 22:25

## 2023-02-22 RX ADMIN — Medication 600 MILLIGRAM(S): at 06:10

## 2023-02-22 RX ADMIN — Medication 1 TABLET(S): at 11:49

## 2023-02-22 RX ADMIN — Medication 600 MILLIGRAM(S): at 17:42

## 2023-02-22 RX ADMIN — Medication 975 MILLIGRAM(S): at 21:05

## 2023-02-22 RX ADMIN — Medication 600 MILLIGRAM(S): at 00:33

## 2023-02-22 RX ADMIN — Medication 600 MILLIGRAM(S): at 18:11

## 2023-02-22 RX ADMIN — Medication 975 MILLIGRAM(S): at 20:27

## 2023-02-22 RX ADMIN — Medication 600 MILLIGRAM(S): at 07:00

## 2023-02-22 RX ADMIN — Medication 600 MILLIGRAM(S): at 12:20

## 2023-02-22 RX ADMIN — Medication 975 MILLIGRAM(S): at 03:09

## 2023-02-22 RX ADMIN — Medication 600 MILLIGRAM(S): at 01:33

## 2023-02-22 RX ADMIN — Medication 975 MILLIGRAM(S): at 04:09

## 2023-02-22 RX ADMIN — DOCOSANOL 1 APPLICATION(S): 100 CREAM TOPICAL at 20:25

## 2023-02-22 RX ADMIN — Medication 600 MILLIGRAM(S): at 11:49

## 2023-02-22 NOTE — PROGRESS NOTE ADULT - ASSESSMENT
NP:  day 1 Progress Note:     Patient seen at bedside resting comfortably offers no current complaints. Ambulating and voiding without difficulty.  Passing flatus and tolerating regular diet.  Bonding well with .  Breastfeeding exclusively . Denies HA, CP, SOB, N/V/D, dizziness, palpitations,  worsening vaginal bleeding, or any other concerns.      Vital Signs Last 24 Hrs  T(C): 36.9 (2023 06:01), Max: 37.4 (2023 22:00)  T(F): 98.5 (2023 06:01), Max: 99.4 (2023 22:00)  HR: 81 (2023 06:01) (67 - 115)  BP: 110/59 (2023 06:01) (94/48 - 216/124)  BP(mean): --  RR: 18 (2023 06:01) (16 - 18)  SpO2: 100% (2023 06:01) (77% - 100%)    Parameters below as of 2023 06:01  Patient On (Oxygen Delivery Method): room air        Physical Exam:     Gen: A&Ox 3, NAD  Chest: CTA B/L  Cardiac: S1,S2; RRR  Breast: Soft, nontender, nonengorged  Abdomen: +BS, Soft, nontender, ND; Fundus firm below umbilicus  Gyn: mod lochia, intact/repaired  Ext: Nontender, DTRS 2+, no worsening edema                          10.7   9.17  )-----------( 268      ( 2023 06:30 )             34.3       A/P: 38yr old F PPD#1 s/p  on .     - Meeting Postpartum milestones   - Motrin/Tylenol PRN  - Plan for discharge tomorrow  - d/w dr Jessica CARDENAS-BC  Office #: [phone]

## 2023-02-23 VITALS
RESPIRATION RATE: 18 BRPM | DIASTOLIC BLOOD PRESSURE: 68 MMHG | HEART RATE: 77 BPM | TEMPERATURE: 99 F | SYSTOLIC BLOOD PRESSURE: 117 MMHG | OXYGEN SATURATION: 99 %

## 2023-02-23 RX ADMIN — DOCOSANOL 1 APPLICATION(S): 100 CREAM TOPICAL at 08:00

## 2023-02-23 RX ADMIN — Medication 1 TABLET(S): at 11:50

## 2023-02-23 RX ADMIN — DOCOSANOL 1 APPLICATION(S): 100 CREAM TOPICAL at 00:00

## 2023-02-23 RX ADMIN — Medication 600 MILLIGRAM(S): at 00:58

## 2023-02-23 RX ADMIN — Medication 600 MILLIGRAM(S): at 06:13

## 2023-02-23 RX ADMIN — Medication 600 MILLIGRAM(S): at 12:19

## 2023-02-23 RX ADMIN — Medication 600 MILLIGRAM(S): at 00:14

## 2023-02-23 RX ADMIN — Medication 600 MILLIGRAM(S): at 11:49

## 2023-02-23 RX ADMIN — SENNA PLUS 1 TABLET(S): 8.6 TABLET ORAL at 05:58

## 2023-02-23 RX ADMIN — Medication 600 MILLIGRAM(S): at 05:38

## 2023-02-23 RX ADMIN — DOCOSANOL 1 APPLICATION(S): 100 CREAM TOPICAL at 13:17

## 2023-02-23 NOTE — PROGRESS NOTE ADULT - SUBJECTIVE AND OBJECTIVE BOX
Post-partum Note,   She is a  38y woman who is now post-partum day: 2    Subjective:  The patient feels well.  She is ambulating.   She is tolerating regular diet.  She denies nausea and vomiting; denies fever.  She is voiding.  Her pain is controlled.  She reports normal postpartum bleeding.  She is breastfeeding.  She is formula feeding.  She is bonding with infant.    Physical exam:    Vital Signs Last 24 Hrs  T(C): 36.9 (2023 06:21), Max: 37.1 (2023 18:12)  T(F): 98.5 (2023 06:21), Max: 98.7 (2023 18:12)  HR: 75 (2023 06:21) (75 - 81)  BP: 118/77 (2023 06:21) (118/77 - 121/66)  BP(mean): --  RR: 17 (2023 06:21) (17 - 17)  SpO2: 100% (2023 06:21) (99% - 100%)    Parameters below as of 2023 06:21  Patient On (Oxygen Delivery Method): room air        Gen: NAD  Breast: Soft, nontender, not engorged.  Abdomen: Soft, nontender, no distension , firm uterine fundus at umbilicus.  Pelvic: Normal lochia noted  Ext: No calf tenderness    LABS:      Rubella status:     Allergies    No Known Allergies    Intolerances      MEDICATIONS  (STANDING):  acetaminophen     Tablet .. 975 milliGRAM(s) Oral <User Schedule>  diphtheria/tetanus/pertussis (acellular) Vaccine (Adacel) 0.5 milliLiter(s) IntraMuscular once  docosanol 10% Cream 1 Application(s) Topical five times a day  ibuprofen  Tablet. 600 milliGRAM(s) Oral every 6 hours  oxytocin Infusion 41.667 milliUNIT(s)/Min (125 mL/Hr) IV Continuous <Continuous>  prenatal multivitamin 1 Tablet(s) Oral daily  sodium chloride 0.9% lock flush 3 milliLiter(s) IV Push every 8 hours    MEDICATIONS  (PRN):  benzocaine 20%/menthol 0.5% Spray 1 Spray(s) Topical every 6 hours PRN for Perineal discomfort  dibucaine 1% Ointment 1 Application(s) Topical every 6 hours PRN Perineal discomfort  diphenhydrAMINE 25 milliGRAM(s) Oral every 6 hours PRN Pruritus  hydrocortisone 1% Cream 1 Application(s) Topical every 6 hours PRN Moderate Pain (4-6)  lanolin Ointment 1 Application(s) Topical every 6 hours PRN nipple soreness  magnesium hydroxide Suspension 30 milliLiter(s) Oral two times a day PRN Constipation  oxyCODONE    IR 5 milliGRAM(s) Oral every 3 hours PRN Moderate to Severe Pain (4-10)  oxyCODONE    IR 5 milliGRAM(s) Oral once PRN Moderate to Severe Pain (4-10)  pramoxine 1%/zinc 5% Cream 1 Application(s) Topical every 4 hours PRN Moderate Pain (4-6)  senna 1 Tablet(s) Oral two times a day PRN Constipation  simethicone 80 milliGRAM(s) Chew every 4 hours PRN Gas  witch hazel Pads 1 Application(s) Topical every 4 hours PRN Perineal discomfort

## 2023-02-23 NOTE — PROGRESS NOTE ADULT - ASSESSMENT
Assessment and Plan  PPD #2 s/p     Doing well.  Increase ambulation.  Encourage breastfeeding.  PP & PPD Instructions reviewed.  PP educational materials reviewed & provided     D/C home today.    Discussed with MD Jessica Barone

## 2023-03-15 NOTE — OB RN PATIENT PROFILE - NS PRO ABUSE SCREEN SUSPICION NEGLECT YN
Encounter addended by: Flor Oates RN on: 3/15/2023 10:56 AM   Actions taken: Charge Capture section accepted
Encounter addended by: Samir Mora on: 3/15/2023 4:47 PM   Actions taken: Charge Capture section accepted
no

## 2023-04-26 NOTE — DISCHARGE NOTE OB - BREAST MILK PROVIDES COLOSTRUM THAT IS HIGH IN PROTEIN
Statement Selected Subsequent Stages Histo Method Verbiage: The presence of tumor at the surgical margins of the previous stage of Mohs micrographic surgery necessitated the excision of additional tissue for tumor clearance. Using a similar technique to that described above, a thin layer of tissue was removed from all areas where tumor was visible on the previous stage. The tissue was again marked with dyes for orientation, mapped on an image, frozen, stained, and microscopically examined by the surgeon.

## 2024-01-01 NOTE — OB PROVIDER TRIAGE NOTE - NSHPPHYSICALEXAM_GEN_ALL_CORE
13.38
Alert and Oriented x 3   Lung CTAB  Heart RRR  Abdomen, gravid  soft and nontender  No Edema       Contractions evrey  4-7 mins apart   CAT 1    Sono:   BPP 8/8    presentation vertex    Placenta: posterior   KRYSTAL: 14.38     Vaginal Exam: 0.5/50/2    Vital Signs Last 24 Hrs  T(C): 36.7 (22 Apr 2021 10:22), Max: 36.7 (22 Apr 2021 10:22)  T(F): 98.1 (22 Apr 2021 10:22), Max: 98.1 (22 Apr 2021 10:22)  HR: 80 (22 Apr 2021 10:40) (80 - 86)  BP: 132/60 (22 Apr 2021 10:40) (127/79 - 132/60)  RR: 16 (22 Apr 2021 10:22) (16 - 16)

## 2024-01-05 NOTE — OB RN PATIENT PROFILE - NS PRO ABUSE SCREEN SUSPICION NEGLECT YN
[FreeTextEntry1] : Pap done Self breast exam stressed Continue oral contraceptives with instructions/precautions Keep menstrual calendar Follow-up 6 months or as needed
no

## 2025-02-21 NOTE — OB PROVIDER DELIVERY SUMMARY - NSADDITIONALPROC_OBGYN_ALL_OB
Ambulatory Care Coordination Note     2/21/2025 10:53 AM     Patient outreach attempt by this ACM today to offer care management services. ACM was unable to reach the patient by telephone today;   voicemail full and unable to leave a message.   ED follow up      ACM: Tahira Freeman RN     Care Summary Note: Follow up attempt for ED visit, lumbar strain, wellbeing.     PCP/Specialist follow up:   Future Appointments         Provider Specialty Dept Phone    2/26/2025 10:00 AM Kevin Ann, PA-C Primary Care [phone]            Follow Up:   Plan for next ACM outreach in approximately 1 week to complete:  - outreach attempt to offer care management services.             No

## 2025-07-01 NOTE — OB PROVIDER H&P - NS_FETALMONCTX30_OBGYN_ALL_OB
Fe Clark  75 y.o. female  Chief Complaint   Patient presents with    Digit Pain     Right foot big toe. Stepped on something 2 days ago    Leg Swelling     Right        Patient presents to Ed for above complaint. Patient thought she had a splinter but unable to find any foreign body on the right great toe. Pt is A&Ox4, in WC but ambulatory. CMS intact. Pictures uploaded to media tab.     Patient educated on triage process and encouraged to alert staff of any changes in condition.   
Greater than 5 Contractions in 30 minutes